# Patient Record
Sex: FEMALE | Race: WHITE | Employment: PART TIME | URBAN - METROPOLITAN AREA
[De-identification: names, ages, dates, MRNs, and addresses within clinical notes are randomized per-mention and may not be internally consistent; named-entity substitution may affect disease eponyms.]

---
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
## 2023-01-11 ENCOUNTER — HOSPITAL ENCOUNTER (EMERGENCY)
Age: 18
Discharge: HOME | End: 2023-01-11
Payer: COMMERCIAL

## 2023-01-11 VITALS
HEART RATE: 78 BPM | HEIGHT: 63 IN | DIASTOLIC BLOOD PRESSURE: 66 MMHG | WEIGHT: 160 LBS | BODY MASS INDEX: 28.35 KG/M2 | OXYGEN SATURATION: 99 % | RESPIRATION RATE: 18 BRPM | SYSTOLIC BLOOD PRESSURE: 117 MMHG | TEMPERATURE: 97.7 F

## 2023-01-11 DIAGNOSIS — R30.0 DYSURIA: Primary | ICD-10-CM

## 2023-01-11 DIAGNOSIS — R31.9 HEMATURIA, UNSPECIFIED TYPE: ICD-10-CM

## 2023-01-11 LAB
BACTERIA: ABNORMAL /HPF
BILIRUBIN URINE: ABNORMAL
BLOOD, URINE: ABNORMAL
CLARITY: ABNORMAL
COLOR: YELLOW
EPITHELIAL CELLS, UA: ABNORMAL /HPF (ref 0–5)
GLUCOSE URINE: NEGATIVE MG/DL
HCG(URINE) PREGNANCY TEST: NEGATIVE
KETONES, URINE: 15 MG/DL
LEUKOCYTE ESTERASE, URINE: ABNORMAL
MICROSCOPIC EXAMINATION: YES
NITRITE, URINE: NEGATIVE
PH UA: 5.5 (ref 5–8)
PROTEIN UA: 30 MG/DL
RBC UA: ABNORMAL /HPF (ref 0–4)
SPECIFIC GRAVITY UA: >=1.03 (ref 1–1.03)
URINE REFLEX TO CULTURE: ABNORMAL
URINE TYPE: ABNORMAL
UROBILINOGEN, URINE: 1 E.U./DL
WBC UA: ABNORMAL /HPF (ref 0–5)

## 2023-01-11 PROCEDURE — 81001 URINALYSIS AUTO W/SCOPE: CPT

## 2023-01-11 PROCEDURE — 87086 URINE CULTURE/COLONY COUNT: CPT

## 2023-01-11 PROCEDURE — 84703 CHORIONIC GONADOTROPIN ASSAY: CPT

## 2023-01-11 PROCEDURE — 99283 EMERGENCY DEPT VISIT LOW MDM: CPT

## 2023-01-11 RX ORDER — METFORMIN HYDROCHLORIDE 500 MG/1
1000 TABLET, EXTENDED RELEASE ORAL
COMMUNITY
Start: 2022-09-29 | End: 2023-03-28

## 2023-01-11 RX ORDER — TRAZODONE HYDROCHLORIDE 50 MG/1
TABLET ORAL
COMMUNITY
Start: 2022-12-29

## 2023-01-11 RX ORDER — PHENAZOPYRIDINE HYDROCHLORIDE 100 MG/1
200 TABLET, FILM COATED ORAL 3 TIMES DAILY PRN
Qty: 6 TABLET | Refills: 0 | Status: SHIPPED | OUTPATIENT
Start: 2023-01-11 | End: 2023-01-13

## 2023-01-11 ASSESSMENT — LIFESTYLE VARIABLES
HOW MANY STANDARD DRINKS CONTAINING ALCOHOL DO YOU HAVE ON A TYPICAL DAY: PATIENT DOES NOT DRINK
HOW OFTEN DO YOU HAVE A DRINK CONTAINING ALCOHOL: NEVER

## 2023-01-11 ASSESSMENT — ENCOUNTER SYMPTOMS
BACK PAIN: 0
ABDOMINAL PAIN: 0
SHORTNESS OF BREATH: 0
DIARRHEA: 0
VOMITING: 0
NAUSEA: 0
COUGH: 0
EYE PAIN: 0

## 2023-01-11 ASSESSMENT — PAIN DESCRIPTION - DESCRIPTORS: DESCRIPTORS: SHARP;PRESSURE

## 2023-01-11 ASSESSMENT — PAIN - FUNCTIONAL ASSESSMENT
PAIN_FUNCTIONAL_ASSESSMENT: 0-10
PAIN_FUNCTIONAL_ASSESSMENT: ACTIVITIES ARE NOT PREVENTED

## 2023-01-11 ASSESSMENT — PAIN DESCRIPTION - PAIN TYPE: TYPE: ACUTE PAIN

## 2023-01-11 NOTE — ED NOTES
Pt scripts x1 instructed to follow up with [facility]. Assessed per Jefe Goodman LPN  49/11/56 7312

## 2023-01-11 NOTE — ED PROVIDER NOTES
[address] [facility]        Pt Name: Alli Pascual  MRN: [id number]  Birthdate 2005  Date of evaluation: 1/11/2023  Provider: KIA Bailey  PCP: Mee Bach MD  Note Started: 12:43 PM EST 1/11/23      TELMA. I have evaluated this patient. My supervising physician was available for consultation. CHIEF COMPLAINT       Chief Complaint   Patient presents with    Hematuria     Pt c/o hematuria  \"for the past year. \" Pt states she has seen urology and just keeps being told its recurrent UTIs. C/o pelvic pressure and burning with urination. HISTORY OF PRESENT ILLNESS: 1 or more Elements     History From: self      Irene Pascual is a 21 y.o. female who presents presents to the emergency department for evaluation of hematuria and dysuria. PMH of insulin resistance and pre-DM, f/b endocrinology on Metformin. Onset over the last 2 weeks. Associated with waxing and waning diffuse lower pelvic pressure worse with urinating. Patient states that she has been dealing with this intermittently for the past year. She had urethral reflux as a child. She reports she has seen her gynecologist and urologist and had an ultrasound and a cystoscopy done last year which were all normal, per the patient. She states that she has not received a diagnosis and she keeps on getting antibiotics prescribed however the urine culture always comes back negative. She has been monogamous with her boyfriend for several years. She denies vaginal discharge, fever, chills. Denies flank pain. No recent travel, exposure to sick contacts No other acute concerns, associated symptoms or modifying factors. The patient denies fever or chills, chest pain, shortness of breath, abdominal pain, nausea, vomiting, diarrhea. No recent travel, exposure to sick contacts, or recent antibiotics. No other acute concerns, associated symptoms or modifying factors.       Nursing Notes were all reviewed and agreed with or any disagreements were addressed in the HPI. REVIEW OF SYSTEMS :      Review of Systems   Constitutional:  Negative for chills, fatigue and fever. Eyes:  Negative for pain. Respiratory:  Negative for cough and shortness of breath. Cardiovascular:  Negative for chest pain. Gastrointestinal:  Negative for abdominal pain, diarrhea, nausea and vomiting. Genitourinary:  Positive for dysuria and hematuria. Negative for decreased urine volume, difficulty urinating, dyspareunia, enuresis, flank pain, frequency, genital sores, menstrual problem, urgency, vaginal bleeding, vaginal discharge and vaginal pain. Musculoskeletal:  Negative for back pain, neck pain and neck stiffness. Skin:  Negative for rash. Neurological:  Negative for dizziness and headaches. Psychiatric/Behavioral:  Negative for confusion. Positives and Pertinent negatives as per HPI. SURGICAL HISTORY     Past Surgical History:   Procedure Laterality Date    HIATAL HERNIA REPAIR      TONSILLECTOMY      TYMPANOSTOMY TUBE PLACEMENT         CURRENTMEDICATIONS       Previous Medications    ELINEST 0.3-30 MG-MCG PER TABLET        METFORMIN (GLUCOPHAGE-XR) 500 MG EXTENDED RELEASE TABLET    Take 1,000 mg by mouth daily (with breakfast)    TRAZODONE (DESYREL) 50 MG TABLET           ALLERGIES     Patient has no known allergies. FAMILYHISTORY     History reviewed. No pertinent family history.      SOCIAL HISTORY       Social History     Tobacco Use    Smoking status: Never    Smokeless tobacco: Never   Substance Use Topics    Alcohol use: No    Drug use: No       SCREENINGS        [place] Coma Scale  Eye Opening: Spontaneous  Best Verbal Response: Oriented  Best Motor Response: Obeys commands  Roxanne Coma Scale Score: 15                CIWA Assessment  BP: 117/66  Heart Rate: 78           PHYSICAL EXAM  1 or more Elements     ED Triage Vitals [01/11/23 1151]   BP Temp Temp Source Heart Rate Resp SpO2 Height Weight - Scale   117/66 97.7 °F (36.5 °C) Oral 78 18 99 % 5' 3\" (1.6 m) 160 lb (72.6 kg)       Physical Exam  Vitals and nursing note reviewed. Constitutional:       General: She is not in acute distress. Appearance: She is well-developed. She is not diaphoretic. HENT:      Head: Normocephalic and atraumatic. Eyes:      General:         Right eye: No discharge. Left eye: No discharge. Pulmonary:      Effort: Pulmonary effort is normal. No respiratory distress. Breath sounds: No stridor. Abdominal:      General: Abdomen is flat. Palpations: Abdomen is soft. Tenderness: There is no abdominal tenderness. There is no guarding or rebound. Musculoskeletal:         General: Normal range of motion. Cervical back: Normal range of motion and neck supple. Skin:     General: Skin is warm and dry. Coloration: Skin is not pale. Neurological:      Mental Status: She is alert and oriented to person, place, and time. Comments: No gross facial drooping. Moves all 4 extremities spontaneously. Psychiatric:         Mood and Affect: Mood normal.         Behavior: Behavior normal.           DIAGNOSTIC RESULTS   LABS:    Labs Reviewed   URINALYSIS WITH REFLEX TO CULTURE - Abnormal; Notable for the following components:       Result Value    Clarity, UA CLOUDY (*)     Bilirubin Urine SMALL (*)     Ketones, Urine 15 (*)     Blood, Urine LARGE (*)     Protein, UA 30 (*)     Leukocyte Esterase, Urine TRACE (*)     All other components within normal limits   MICROSCOPIC URINALYSIS - Abnormal; Notable for the following components:    RBC, UA 21-50 (*)     Bacteria, UA Rare (*)     All other components within normal limits   CULTURE, URINE   PREGNANCY, URINE       When ordered only abnormal lab results are displayed. All other labs were within normal range or not returned as of this dictation. EKG:  When ordered, EKG's are interpreted by the Emergency Department Physician in the absence of a cardiologist.  Please see their note for interpretation of EKG. RADIOLOGY:   Non-plain film images such as CT, Ultrasound and MRI are read by the radiologist. Plain radiographic images are visualized and preliminarily interpreted by the ED Provider with the below findings:        Interpretation per the Radiologist below, if available at the time of this note:    No orders to display     No results found. No results found. PROCEDURES   Unless otherwise noted below, none     Procedures    CRITICAL CARE TIME (.cctime)       PAST MEDICAL HISTORY      has a past medical history of Diabetes mellitus ([facility] [address] EMERGENCY DEPARTMENT COURSE and DIFFERENTIAL DIAGNOSIS/MDM:   Vitals:    Vitals:    01/11/23 1151   BP: 117/66   Pulse: 78   Resp: 18   Temp: 97.7 °F (36.5 °C)   TempSrc: Oral   SpO2: 99%   Weight: 160 lb (72.6 kg)   Height: 5' 3\" (1.6 m)       Patient was given the following medications:  Medications - No data to display          Is this patient to be included in the SEP-1 Core Measure due to severe sepsis or septic shock? No   Exclusion criteria - the patient is NOT to be included for SEP-1 Core Measure due to: Infection is not suspected    Chronic Conditions affecting care:    has a past medical history of Diabetes mellitus ([facility] [address] CONSULTS: (Who and What was discussed)  None      Social Determinants : None    Records Reviewed (Source): PMD endocrinology reviewed - insulin resistance, pre-DM. On metformin. Not on insulin. CC/HPI Summary, DDx, ED Course, and Reassessment:   Differential Diagnosis: Urinary retention, pyelonephritis, bladder infection, urosepsis, GI emergency, surgical emergency, dehydration, other    Patient is afebrile and nontoxic with unremarkable vital signs. Presented for c/o hematuria and dysuria onset 2 weeks. This has been an intermittent issue for the past year and reports she's had a normal cystoscopy and pelvic ultrasound with Urology group.  Urine Cx grows only normal cassius. VS normal. Abdomen soft, nonsurgical abdomen, reassuring exam. No CVA tenderness to suggest pyelonephritis. Do not suspect sepsis. Do not suspect acute GI emergency. Obtained UA. Considered further evaluation with CBC/CMP/lipase/CT however given reassuring VS, physical exam, I have very low suspicion for Pyelo, Sepsis, GI emergency. Urinalysis negative for WBCs or nitrites which does not strongly suggest UTI. She does have RBCs. Patient hesitant to go on another antibiotic when cultures are always negative which I do agree with, will have urine cultured today. Will prescribe Pyridium for dysuria pain which she states has been helpful in the past.   Had lengthy discussion regarding soaps/detergents/condoms/etc as possible irritants and to avoid as much as possible. Encouraged close follow up with urology group. Given information for [facility] urogyn for second opinion as desired. Discussed possible interstitial cystitis which urology could further evaluate her for. At this time I believe patient's presentation does not warrant further workup with labs or imaging in the emergency department and is stable for discharge home. I estimate there is LOW risk for ACUTE APPENDICITIS, BOWEL OBSTRUCTION, CHOLECYSTITIS, DIVERTICULITIS, INCARCERATED HERNIA, PANCREATITIS, PELVIC INFLAMMATORY DISEASE, OVARIAN TORSION, PERFORATED BOWEL,  BOWEL ISCHEMIA, CARDIAC ISCHEMIA, ECTOPIC PREGNANCY, or TUBO-OVARIAN ABSCESS, thus I consider the discharge disposition reasonable. Also, there is no evidence or peritonitis, sepsis, or toxicity. At this time I believe patient's presentation does not warrant further workup with labs or imaging in the emergency department and is stable for discharge home.  Patient will be discharged with medications as listed below with instructions to follow up with the primary care physician for reevaluation in the next few days, and to return to the emergency department for any worsening symptoms or further concerns. They verbalized understanding and were discharged in stable condition. I am the Primary Clinician of Record. FINAL IMPRESSION      1. Dysuria    2.  Hematuria, unspecified type          DISPOSITION/PLAN     DISPOSITION Decision To Discharge 01/11/2023 12:50:51 PM      PATIENT REFERRED TO:    call [address] [place] [address] [facility] for 2nd opinion if desired: Call [phone], [address] [facility], [address] [place] [address], [address] [place] [address]          follow up with [facility] for further evaluation        DISCHARGE MEDICATIONS:  New Prescriptions    PHENAZOPYRIDINE (PYRIDIUM) 100 MG TABLET    Take 2 tablets by mouth 3 times daily as needed for Pain       DISCONTINUED MEDICATIONS:  Discontinued Medications    No medications on file              (Please note that portions of this note were completed with a voice recognition program.  Efforts were made to edit the dictations but occasionally words are mis-transcribed.)    KIA Batista (electronically signed)       KIA Batista  01/11/23 [place], [place]  01/12/23 5225

## 2023-01-12 LAB — URINE CULTURE, ROUTINE: NORMAL

## 2023-02-23 ENCOUNTER — HOSPITAL ENCOUNTER (EMERGENCY)
Age: 18
Discharge: HOME | End: 2023-02-23
Attending: EMERGENCY MEDICINE
Payer: COMMERCIAL

## 2023-02-23 VITALS
HEART RATE: 51 BPM | OXYGEN SATURATION: 98 % | TEMPERATURE: 98.3 F | HEIGHT: 64 IN | RESPIRATION RATE: 14 BRPM | SYSTOLIC BLOOD PRESSURE: 106 MMHG | BODY MASS INDEX: 27.31 KG/M2 | DIASTOLIC BLOOD PRESSURE: 63 MMHG | WEIGHT: 160 LBS

## 2023-02-23 DIAGNOSIS — R51.9 NONINTRACTABLE HEADACHE, UNSPECIFIED CHRONICITY PATTERN, UNSPECIFIED HEADACHE TYPE: Primary | ICD-10-CM

## 2023-02-23 LAB
A/G RATIO: 1.5 (ref 1.1–2.2)
ALBUMIN SERPL-MCNC: 4.3 G/DL (ref 3.4–5)
ALP BLD-CCNC: 63 U/L (ref 40–129)
ALT SERPL-CCNC: 20 U/L (ref 10–40)
ANION GAP SERPL CALCULATED.3IONS-SCNC: 9 MMOL/L (ref 3–16)
AST SERPL-CCNC: 26 U/L (ref 15–37)
BACTERIA: ABNORMAL /HPF
BASOPHILS ABSOLUTE: 0 K/UL (ref 0–0.2)
BASOPHILS RELATIVE PERCENT: 0.8 %
BILIRUB SERPL-MCNC: 0.5 MG/DL (ref 0–1)
BILIRUBIN URINE: NEGATIVE
BLOOD, URINE: ABNORMAL
BUN BLDV-MCNC: 7 MG/DL (ref 7–20)
CALCIUM SERPL-MCNC: 9.8 MG/DL (ref 8.3–10.6)
CHLORIDE BLD-SCNC: 102 MMOL/L (ref 99–110)
CLARITY: ABNORMAL
CO2: 25 MMOL/L (ref 21–32)
COLOR: YELLOW
CREAT SERPL-MCNC: 0.8 MG/DL (ref 0.6–1.1)
EOSINOPHILS ABSOLUTE: 0.2 K/UL (ref 0–0.6)
EOSINOPHILS RELATIVE PERCENT: 4.4 %
EPITHELIAL CELLS, UA: ABNORMAL /HPF (ref 0–5)
GFR SERPL CREATININE-BSD FRML MDRD: >60 ML/MIN/{1.73_M2}
GLUCOSE BLD-MCNC: 84 MG/DL (ref 70–99)
GLUCOSE URINE: NEGATIVE MG/DL
HCG(URINE) PREGNANCY TEST: NEGATIVE
HCT VFR BLD CALC: 38.7 % (ref 36–48)
HEMOGLOBIN: 13 G/DL (ref 12–16)
INFLUENZA A: NOT DETECTED
INFLUENZA B: NOT DETECTED
KETONES, URINE: NEGATIVE MG/DL
LEUKOCYTE ESTERASE, URINE: ABNORMAL
LYMPHOCYTES ABSOLUTE: 2.1 K/UL (ref 1–5.1)
LYMPHOCYTES RELATIVE PERCENT: 48.1 %
MCH RBC QN AUTO: 26.8 PG (ref 26–34)
MCHC RBC AUTO-ENTMCNC: 33.5 G/DL (ref 31–36)
MCV RBC AUTO: 80.1 FL (ref 80–100)
MICROSCOPIC EXAMINATION: YES
MONO TEST: NEGATIVE
MONOCYTES ABSOLUTE: 0.3 K/UL (ref 0–1.3)
MONOCYTES RELATIVE PERCENT: 7.4 %
NEUTROPHILS ABSOLUTE: 1.7 K/UL (ref 1.7–7.7)
NEUTROPHILS RELATIVE PERCENT: 39.3 %
NITRITE, URINE: NEGATIVE
PDW BLD-RTO: 14.4 % (ref 12.4–15.4)
PH UA: 5.5 (ref 5–8)
PLATELET # BLD: 195 K/UL (ref 135–450)
PMV BLD AUTO: 8.7 FL (ref 5–10.5)
POTASSIUM SERPL-SCNC: 3.9 MMOL/L (ref 3.5–5.1)
PROTEIN UA: NEGATIVE MG/DL
RBC # BLD: 4.82 M/UL (ref 4–5.2)
RBC UA: ABNORMAL /HPF (ref 0–4)
SARS-COV-2 RNA, RT PCR: NOT DETECTED
SODIUM BLD-SCNC: 136 MMOL/L (ref 136–145)
SPECIFIC GRAVITY UA: 1.02 (ref 1–1.03)
TOTAL PROTEIN: 7.2 G/DL (ref 6.4–8.2)
URINE REFLEX TO CULTURE: YES
URINE TYPE: ABNORMAL
UROBILINOGEN, URINE: 0.2 E.U./DL
WBC # BLD: 4.4 K/UL (ref 4–11)
WBC UA: ABNORMAL /HPF (ref 0–5)

## 2023-02-23 PROCEDURE — 6360000002 HC RX W HCPCS: Performed by: EMERGENCY MEDICINE

## 2023-02-23 PROCEDURE — 80053 COMPREHEN METABOLIC PANEL: CPT

## 2023-02-23 PROCEDURE — 99284 EMERGENCY DEPT VISIT MOD MDM: CPT

## 2023-02-23 PROCEDURE — 96374 THER/PROPH/DIAG INJ IV PUSH: CPT

## 2023-02-23 PROCEDURE — 85025 COMPLETE CBC W/AUTO DIFF WBC: CPT

## 2023-02-23 PROCEDURE — 84703 CHORIONIC GONADOTROPIN ASSAY: CPT

## 2023-02-23 PROCEDURE — 87086 URINE CULTURE/COLONY COUNT: CPT

## 2023-02-23 PROCEDURE — 86308 HETEROPHILE ANTIBODY SCREEN: CPT

## 2023-02-23 PROCEDURE — 87636 SARSCOV2 & INF A&B AMP PRB: CPT

## 2023-02-23 PROCEDURE — 96375 TX/PRO/DX INJ NEW DRUG ADDON: CPT

## 2023-02-23 PROCEDURE — 81001 URINALYSIS AUTO W/SCOPE: CPT

## 2023-02-23 PROCEDURE — 2580000003 HC RX 258: Performed by: EMERGENCY MEDICINE

## 2023-02-23 RX ORDER — 0.9 % SODIUM CHLORIDE 0.9 %
1000 INTRAVENOUS SOLUTION INTRAVENOUS ONCE
Status: COMPLETED | OUTPATIENT
Start: 2023-02-23 | End: 2023-02-23

## 2023-02-23 RX ORDER — METOCLOPRAMIDE HYDROCHLORIDE 5 MG/ML
10 INJECTION INTRAMUSCULAR; INTRAVENOUS ONCE
Status: COMPLETED | OUTPATIENT
Start: 2023-02-23 | End: 2023-02-23

## 2023-02-23 RX ORDER — DIPHENHYDRAMINE HYDROCHLORIDE 50 MG/ML
25 INJECTION INTRAMUSCULAR; INTRAVENOUS ONCE
Status: COMPLETED | OUTPATIENT
Start: 2023-02-23 | End: 2023-02-23

## 2023-02-23 RX ORDER — KETOROLAC TROMETHAMINE 30 MG/ML
15 INJECTION, SOLUTION INTRAMUSCULAR; INTRAVENOUS ONCE
Status: COMPLETED | OUTPATIENT
Start: 2023-02-23 | End: 2023-02-23

## 2023-02-23 RX ADMIN — SODIUM CHLORIDE 1000 ML: 9 INJECTION, SOLUTION INTRAVENOUS at 12:30

## 2023-02-23 RX ADMIN — KETOROLAC TROMETHAMINE 15 MG: 30 INJECTION, SOLUTION INTRAMUSCULAR; INTRAVENOUS at 12:31

## 2023-02-23 RX ADMIN — METOCLOPRAMIDE 10 MG: 5 INJECTION, SOLUTION INTRAMUSCULAR; INTRAVENOUS at 12:31

## 2023-02-23 RX ADMIN — DIPHENHYDRAMINE HYDROCHLORIDE 25 MG: 50 INJECTION, SOLUTION INTRAMUSCULAR; INTRAVENOUS at 12:31

## 2023-02-23 ASSESSMENT — PAIN SCALES - GENERAL
PAINLEVEL_OUTOF10: 7
PAINLEVEL_OUTOF10: 7
PAINLEVEL_OUTOF10: 9

## 2023-02-23 ASSESSMENT — PAIN DESCRIPTION - ONSET: ONSET: ON-GOING

## 2023-02-23 ASSESSMENT — PAIN DESCRIPTION - FREQUENCY: FREQUENCY: CONTINUOUS

## 2023-02-23 ASSESSMENT — PAIN - FUNCTIONAL ASSESSMENT
PAIN_FUNCTIONAL_ASSESSMENT: 0-10
PAIN_FUNCTIONAL_ASSESSMENT: 0-10

## 2023-02-23 ASSESSMENT — PAIN DESCRIPTION - LOCATION: LOCATION: HEAD

## 2023-02-23 ASSESSMENT — PAIN DESCRIPTION - DESCRIPTORS: DESCRIPTORS: ACHING

## 2023-02-23 NOTE — LETTER
[facility]  ED  [address] [address]  Phone: [phone]  Fax: [phone]               February 23, 2023    Patient: Harshil Henry   YOB: 2005   Date of Visit: 2/23/2023       To Whom It May Concern:    Mitchell Louis was seen and treated in our emergency department on 2/23/2023. She may return to work on 02/24/2023.       Sincerely,               Signature:__________________________________

## 2023-02-23 NOTE — DISCHARGE INSTRUCTIONS
Stay hydrated take ibuprofen and Tylenol for continued symptoms. Return to emergency department any new or emergent concerns.

## 2023-02-24 LAB — URINE CULTURE, ROUTINE: NORMAL

## 2023-02-24 NOTE — ED PROVIDER NOTES
Emergency Department Provider Note  Location: [address] [place] [address]  ED  2/23/2023     Patient Identification  Irene Cooper is a 21 y.o. female    Chief Complaint  Headache (X2 days, fever 101.3 yesterday. States eyes and skin looked yellow yesterday. Lingering cough since November. )          HPI  (History provided by patient)  Patient is a 68-year-old female who says she has type \"1.5 diabetes\" who reports to the emergency department for headache for the past 2 days. She also reports that she has had subjective fevers yesterday chills and body aches. She also endorses a sore throat and dry cough. She thought her skin looked \"a bit yellow in my eyes\". She does not think it looks yellow today. Denies any sudden onset of headache progressed throughout the day no vision changes no neck pain or stiffness. Had similar headaches in the past.      I have reviewed the following nursing documentation:  Allergies: No Known Allergies    Past medical history:  has a past medical history of Diabetes mellitus ([address] Past surgical history:  has a past surgical history that includes Tympanostomy tube placement; hiatal hernia repair; and Tonsillectomy. Home medications:   Prior to Admission medications    Medication Sig Start Date End Date Taking? Authorizing Provider   metFORMIN (GLUCOPHAGE-XR) 500 MG extended release tablet Take 1,000 mg by mouth daily (with breakfast) 9/29/22 3/28/23  Historical Provider, MD   traZODone (DESYREL) 50 MG tablet  12/29/22   Historical Provider, MD Soren Andrea 0.3-30 MG-MCG per tablet  6/7/21   Historical Provider, MD       Social history:  reports that she has never smoked. She has never used smokeless tobacco. She reports that she does not drink alcohol and does not use drugs. Family history:  No family history on file.           Exam  ED Triage Vitals [02/23/23 1020]   BP Temp Temp Source Heart Rate Resp SpO2 Height Weight - Scale   (!) 144/74 98.3 °F (36.8 °C) Oral 90 14 100 % 5' 4\" (1.626 m) 160 lb (72.6 kg)       Physical Exam  Vitals and nursing note reviewed. Constitutional:       General: She is not in acute distress. Appearance: She is well-developed. HENT:      Head: Normocephalic and atraumatic. Right Ear: Tympanic membrane and ear canal normal.      Left Ear: Tympanic membrane and ear canal normal.      Nose: Nose normal. No congestion. Mouth/Throat:      Mouth: Mucous membranes are moist.      Pharynx: Oropharynx is clear. Posterior oropharyngeal erythema present. No oropharyngeal exudate. Eyes:      General: No scleral icterus. Extraocular Movements: Extraocular movements intact. Pupils: Pupils are equal, round, and reactive to light. Neck:      Comments: No meningeal signs  Cardiovascular:      Rate and Rhythm: Normal rate and regular rhythm. Heart sounds: No murmur heard. Pulmonary:      Effort: Pulmonary effort is normal.      Breath sounds: Normal breath sounds. Abdominal:      General: There is no distension. Palpations: Abdomen is soft. Tenderness: There is no abdominal tenderness. Musculoskeletal:         General: No deformity. Normal range of motion. Cervical back: Normal range of motion and neck supple. No rigidity or tenderness. Lymphadenopathy:      Cervical: No cervical adenopathy. Skin:     General: Skin is warm. Findings: No rash. Neurological:      Mental Status: She is alert and oriented to person, place, and time. Cranial Nerves: No cranial nerve deficit. Motor: No abnormal muscle tone.       Coordination: Coordination normal.   Psychiatric:         Mood and Affect: Mood normal.         Behavior: Behavior normal.         MDM/ED Course    ED Medication Orders (From admission, onward)      Start Ordered     Status Ordering Provider    02/23/23 1215 02/23/23 1200  metoclopramide (REGLAN) injection 10 mg  ONCE         Last MAR action: Given - by Jhoan Camps on 02/23/23 at 1231 KEL KUNZ    02/23/23 1215 02/23/23 1200  diphenhydrAMINE (BENADRYL) injection 25 mg  ONCE         Last MAR action: Given - by Louann Pin on 02/23/23 at 1231 KEL KUNZ    02/23/23 1215 02/23/23 1200  ketorolac (TORADOL) injection 15 mg  ONCE         Last MAR action: Given - by Louann Pin on 02/23/23 at 1231 KEL KUNZ    02/23/23 1215 02/23/23 1200  0.9 % sodium chloride bolus  ONCE         Last MAR action: Stopped - by July Perez on 02/23/23 at 1535 [facility], [address] [facility] Rosaline BEAR              Radiology  No results found. Bedside Ultrasound  No results found. Labs  Results for orders placed or performed during the hospital encounter of 02/23/23   Culture, Urine    Specimen: Urine, clean catch   Result Value Ref Range    Urine Culture, Routine       >50,000 CFU/ml mixed skin/urogenital cassius.  No further workup   COVID-19 & Influenza Combo    Specimen: Nasopharyngeal Swab   Result Value Ref Range    SARS-CoV-2 RNA, RT PCR NOT DETECTED NOT DETECTED    INFLUENZA A NOT DETECTED NOT DETECTED    INFLUENZA B NOT DETECTED NOT DETECTED   CBC with Auto Differential   Result Value Ref Range    WBC 4.4 4.0 - 11.0 K/uL    RBC 4.82 4.00 - 5.20 M/uL    Hemoglobin 13.0 12.0 - 16.0 g/dL    Hematocrit 38.7 36.0 - 48.0 %    MCV 80.1 80.0 - 100.0 fL    MCH 26.8 26.0 - 34.0 pg    MCHC 33.5 31.0 - 36.0 g/dL    RDW 14.4 12.4 - 15.4 %    Platelets 739 126 - 829 K/uL    MPV 8.7 5.0 - 10.5 fL    Neutrophils % 39.3 %    Lymphocytes % 48.1 %    Monocytes % 7.4 %    Eosinophils % 4.4 %    Basophils % 0.8 %    Neutrophils Absolute 1.7 1.7 - 7.7 K/uL    Lymphocytes Absolute 2.1 1.0 - 5.1 K/uL    Monocytes Absolute 0.3 0.0 - 1.3 K/uL    Eosinophils Absolute 0.2 0.0 - 0.6 K/uL    Basophils Absolute 0.0 0.0 - 0.2 K/uL   Comprehensive Metabolic Panel   Result Value Ref Range    Sodium 136 136 - 145 mmol/L    Potassium 3.9 3.5 - 5.1 mmol/L    Chloride 102 99 - 110 mmol/L    CO2 25 21 - 32 mmol/L    Anion Gap 9 3 - 16 Glucose 84 70 - 99 mg/dL    BUN 7 7 - 20 mg/dL    Creatinine 0.8 0.6 - 1.1 mg/dL    Est, Glom Filt Rate >60 >60    Calcium 9.8 8.3 - 10.6 mg/dL    Total Protein 7.2 6.4 - 8.2 g/dL    Albumin 4.3 3.4 - 5.0 g/dL    Albumin/Globulin Ratio 1.5 1.1 - 2.2    Total Bilirubin 0.5 0.0 - 1.0 mg/dL    Alkaline Phosphatase 63 40 - 129 U/L    ALT 20 10 - 40 U/L    AST 26 15 - 37 U/L   Urinalysis with Reflex to Culture    Specimen: Urine   Result Value Ref Range    Color, UA Yellow Straw/Yellow    Clarity, UA CLOUDY (A) Clear    Glucose, Ur Negative Negative mg/dL    Bilirubin Urine Negative Negative    Ketones, Urine Negative Negative mg/dL    Specific Gravity, UA 1.020 1.005 - 1.030    Blood, Urine TRACE-LYSED (A) Negative    pH, UA 5.5 5.0 - 8.0    Protein, UA Negative Negative mg/dL    Urobilinogen, Urine 0.2 <2.0 E.U./dL    Nitrite, Urine Negative Negative    Leukocyte Esterase, Urine MODERATE (A) Negative    Microscopic Examination YES     Urine Type NotGiven     Urine Reflex to Culture Yes    Pregnancy, Urine   Result Value Ref Range    HCG(Urine) Pregnancy Test Negative Detects HCG level >20 MIU/mL   Microscopic Urinalysis   Result Value Ref Range    WBC, UA 10-20 (A) 0 - 5 /HPF    RBC, UA 3-4 0 - 4 /HPF    Epithelial Cells, UA 11-20 (A) 0 - 5 /HPF    Bacteria, UA 4+ (A) None Seen /HPF   Mononucleosis Screen   Result Value Ref Range    Mono Test Negative Negative         Procedures  Procedures      Patient seen and evaluated. Relevant records reviewed. - Patient is 21 y.o. female presented for acute headache fevers chills cough sore throat in setting or chronic conditions non-insulin-dependent diabetes. Her evaluation here is consistent with viral URI and associated headache. No concern for meningitis at this point nor sepsis subarachnoid nor any other emergent process at this point. .  - Exam showed well-appearing female no acute distress reassuring vital signs.   She has no objective findings on physical exam.  Mabel Taylor of the headache does not sound concerning.  - Patient was placed on telemetry during his/her ED stay and no malignant dysrhythmia observed. - Diagnostic studies reviewed. CBC CMP urinalysis no acute pathology identified  - Lab:  did not show clinically significant pathology. - Is this patient to be included in the SEP-1 Core Measure due to severe sepsis or septic shock? No   Exclusion criteria - the patient is NOT to be included for SEP-1 Core Measure due to:  Viral etiology found or highly suspected (including COVID-19) without concomitant bacterial infection    - Patient was given    ED Medication Orders (From admission, onward)      Start Ordered     Status Ordering Provider    02/23/23 1215 02/23/23 1200  metoclopramide (REGLAN) injection 10 mg  ONCE         Last MAR action: Given - by Lauren York on 02/23/23 at 1231 KEL KUNZ    02/23/23 1215 02/23/23 1200  diphenhydrAMINE (BENADRYL) injection 25 mg  ONCE         Last MAR action: Given - by Lauren York on 02/23/23 at 1231 KEL KUNZ    02/23/23 1215 02/23/23 1200  ketorolac (TORADOL) injection 15 mg  ONCE         Last MAR action: Given - by Lauren York on 02/23/23 at 1231 KEL KUNZ    02/23/23 1215 02/23/23 1200  0.9 % sodium chloride bolus  ONCE         Last MAR action: Stopped - by Marilia Martinez on 02/23/23 at 1326 KEL KUNZ            Upon reassessment, Irene Zhangain Rojas had almost complete resolution of her headache after headache cocktail and IV fluids. COVID swab flu swab are negative. .      Social Determinants of Health: Has documented PCP that she can follow-up with, reliable transportation    - I discussed the results with patient/family including incidental findings . We agreed to discharge  - At this point I do not see indication for further work-up in the ER, as it is unlikely  and poses more risk than benefit. - Return precautions also discussed.   Patient/family verbalized understanding of care plan and agreeable to plan.        Clinical Impression:  1. Nonintractable headache, unspecified chronicity pattern, unspecified headache type          Disposition:  Discharge to home in good condition.    Blood pressure 106/63, pulse 51, temperature 98.3 °F (36.8 °C), temperature source Oral, resp. rate 14, height 5' 4\" (1.626 m), weight 160 lb (72.6 kg), last menstrual period 02/02/2023, SpO2 98 %.    Patient was given scripts for the following medications. I counseled patient how to take these medications.   Discharge Medication List as of 2/23/2023  1:33 PM        Discharge Medication List as of 2/23/2023  1:33 PM          Disposition referral (if applicable):  Dayday Branch MD  [address] [place] [address] Dr. Memo Palacio  [facility] [address]  [phone]    Schedule an appointment as soon as possible for a visit         IEzequiel, am the primary attending of record and contributed the majority of evaluation and treatment of emergent care for this encounter.     Total critical care time is 0 minutes, which excludes separately billable procedures and updating family. Time spent is specifically for management of the presenting complaint and symptoms initially, direct bedside care, reevaluation, review of records, and consultation.  There was a high probability of clinically significant life-threatening deterioration in the patient's condition, which required my urgent intervention.     This chart was generated in part by using Dragon Dictation system and may contain errors related to that system including errors in grammar, punctuation, and spelling, as well as words and phrases that may be inappropriate. If there are any questions or concerns please feel free to contact the dictating provider for clarification.     Ezequiel Srivastava MD  [facility]        Ezequiel Srivastava MD  02/24/23 3759

## 2023-03-26 ENCOUNTER — HOSPITAL ENCOUNTER (EMERGENCY)
Age: 18
Discharge: HOME | End: 2023-03-26
Payer: COMMERCIAL

## 2023-03-26 ENCOUNTER — APPOINTMENT (OUTPATIENT)
Dept: GENERAL RADIOLOGY | Age: 18
End: 2023-03-26
Payer: COMMERCIAL

## 2023-03-26 VITALS
OXYGEN SATURATION: 100 % | TEMPERATURE: 97.8 F | HEART RATE: 78 BPM | DIASTOLIC BLOOD PRESSURE: 69 MMHG | RESPIRATION RATE: 18 BRPM | SYSTOLIC BLOOD PRESSURE: 125 MMHG

## 2023-03-26 DIAGNOSIS — M77.50 TENDONITIS OF FOOT: ICD-10-CM

## 2023-03-26 DIAGNOSIS — M79.672 LEFT FOOT PAIN: Primary | ICD-10-CM

## 2023-03-26 PROCEDURE — 99283 EMERGENCY DEPT VISIT LOW MDM: CPT

## 2023-03-26 PROCEDURE — 6370000000 HC RX 637 (ALT 250 FOR IP): Performed by: NURSE PRACTITIONER

## 2023-03-26 PROCEDURE — 73630 X-RAY EXAM OF FOOT: CPT

## 2023-03-26 RX ORDER — ACETAMINOPHEN 500 MG
1000 TABLET ORAL ONCE
Status: COMPLETED | OUTPATIENT
Start: 2023-03-26 | End: 2023-03-26

## 2023-03-26 RX ORDER — NAPROXEN 250 MG/1
500 TABLET ORAL ONCE
Status: COMPLETED | OUTPATIENT
Start: 2023-03-26 | End: 2023-03-26

## 2023-03-26 RX ORDER — NAPROXEN 500 MG/1
500 TABLET ORAL 2 TIMES DAILY WITH MEALS
Qty: 60 TABLET | Refills: 0 | Status: SHIPPED | OUTPATIENT
Start: 2023-03-26

## 2023-03-26 RX ADMIN — NAPROXEN 500 MG: 250 TABLET ORAL at 14:50

## 2023-03-26 RX ADMIN — ACETAMINOPHEN 1000 MG: 500 TABLET ORAL at 14:50

## 2023-03-26 ASSESSMENT — PAIN SCALES - GENERAL
PAINLEVEL_OUTOF10: 4
PAINLEVEL_OUTOF10: 4

## 2023-03-26 ASSESSMENT — PAIN DESCRIPTION - PAIN TYPE: TYPE: ACUTE PAIN

## 2023-03-26 ASSESSMENT — PAIN - FUNCTIONAL ASSESSMENT
PAIN_FUNCTIONAL_ASSESSMENT: 0-10
PAIN_FUNCTIONAL_ASSESSMENT: NONE - DENIES PAIN

## 2023-03-26 ASSESSMENT — PAIN DESCRIPTION - ORIENTATION: ORIENTATION: LEFT

## 2023-03-26 ASSESSMENT — PAIN DESCRIPTION - LOCATION: LOCATION: FOOT

## 2023-03-26 NOTE — ED PROVIDER NOTES
noted. Respirations unlabored. Good air exchange. ABDOMEN: Soft. Non-distended. Non-tender. EXTREMITIES: No peripheral edema. Moves all extremities equally. All extremities neurovascularly intact. TTP to arch of left foot, PT and DP pulses 2+, no erythema, edema. Dorsiflexion and plantar flexion intact but limited with pain. SKIN: Warm and dry. No acute rashes. NEUROLOGICAL: Alert and oriented. No gross facial drooping. Strength 5/5, sensation intact. EKG  When ordered, EKG's are interpreted by the ED Physician in the absence of a Cardiologist.  Please see their note for interpretation of EKG. LABS  Labs Reviewed - No data to display  When ordered, only abnormal lab results are displayed. All other labs were within normal range or not returned as of this dictation. RADIOLOGY  Non-plain film images such as CT, U/S, and MRI are read by the radiologist.  Plain radiographic images are visualized and preliminarily interpreted by the ED Provider with the below findings:       Interpretation per the Radiologist below, if available at the time of this note:  XR FOOT LEFT (MIN 3 VIEWS)   Final Result   Normal left foot radiographs. PROCEDURES  Unless otherwise noted below, none. ED COURSE/DDx/MDM  History obtained from:  Patient    Vitals:  Vitals:    03/26/23 1421   BP: 125/69   Pulse: 78   Resp: 18   Temp: 97.8 °F (36.6 °C)   TempSrc: Oral   SpO2: 100%     Patient received following medications in ED:  Medications   acetaminophen (TYLENOL) tablet 1,000 mg (1,000 mg Oral Given 3/26/23 1450)   naproxen (NAPROSYN) tablet 500 mg (500 mg Oral Given 3/26/23 1450)     Sepsis Consideration:  Is this patient to be included in the SEP-1 Core Measure due to severe sepsis or septic shock? No   Exclusion criteria - the patient is NOT to be included for SEP-1 Core Measure due to: Infection is not suspected    Records Reviewed:   None.     Chronic conditions affecting care:   DM.   has a past medical

## 2023-03-26 NOTE — Clinical Note
Ashley Zaragoza was seen and treated in our emergency department on 3/26/2023. She may return to work on 03/27/2023. If you have any questions or concerns, please don't hesitate to call.       Kelsey Villanueva, VIKTORIYA - CNP

## 2023-03-26 NOTE — DISCHARGE INSTRUCTIONS
You are seen today for left foot pain. Your x-ray was negative for any dislocations, fractures, swelling or fluid. You may have some tendinitis due to overuse however would recommend you follow-up with Ortho in the next 1 to 2 days. You have been given an Ace wrap and postop shoe for support as well as crutches. I would recommend elevating foot and using ice as well as rotating Tylenol with naproxen for pain control.   If you have any worsening symptoms including worsening pain, redness or swelling, changes of sensation to the foot, weakness please return to the ER for reevaluation

## 2023-07-24 LAB
C. TRACHOMATIS, EXTERNAL RESULT: NEGATIVE
N. GONORRHOEAE, EXTERNAL RESULT: NEGATIVE

## 2023-07-31 ENCOUNTER — HOSPITAL ENCOUNTER (EMERGENCY)
Age: 18
Discharge: HOME | End: 2023-08-01
Payer: COMMERCIAL

## 2023-07-31 DIAGNOSIS — O46.90 VAGINAL BLEEDING IN PREGNANCY: Primary | ICD-10-CM

## 2023-07-31 PROCEDURE — 99284 EMERGENCY DEPT VISIT MOD MDM: CPT

## 2023-07-31 ASSESSMENT — PAIN SCALES - GENERAL: PAINLEVEL_OUTOF10: 2

## 2023-07-31 ASSESSMENT — PAIN DESCRIPTION - LOCATION: LOCATION: ABDOMEN

## 2023-07-31 ASSESSMENT — PAIN - FUNCTIONAL ASSESSMENT: PAIN_FUNCTIONAL_ASSESSMENT: 0-10

## 2023-07-31 ASSESSMENT — PAIN DESCRIPTION - PAIN TYPE: TYPE: ACUTE PAIN

## 2023-07-31 ASSESSMENT — PAIN DESCRIPTION - DESCRIPTORS: DESCRIPTORS: CRAMPING

## 2023-08-01 VITALS
WEIGHT: 145 LBS | HEART RATE: 76 BPM | OXYGEN SATURATION: 98 % | SYSTOLIC BLOOD PRESSURE: 108 MMHG | HEIGHT: 63 IN | RESPIRATION RATE: 14 BRPM | DIASTOLIC BLOOD PRESSURE: 63 MMHG | BODY MASS INDEX: 25.69 KG/M2 | TEMPERATURE: 97.9 F

## 2023-08-01 LAB
ABO + RH BLD: NORMAL
ALBUMIN SERPL-MCNC: 4.6 G/DL (ref 3.4–5)
ALBUMIN/GLOB SERPL: 1.6 {RATIO} (ref 1.1–2.2)
ALP SERPL-CCNC: 57 U/L (ref 40–129)
ALT SERPL-CCNC: 11 U/L (ref 10–40)
ANION GAP SERPL CALCULATED.3IONS-SCNC: 11 MMOL/L (ref 3–16)
AST SERPL-CCNC: 20 U/L (ref 15–37)
B-HCG SERPL EIA 3RD IS-ACNC: NORMAL MIU/ML
BASOPHILS # BLD: 0 K/UL (ref 0–0.2)
BASOPHILS NFR BLD: 0.6 %
BILIRUB SERPL-MCNC: <0.2 MG/DL (ref 0–1)
BILIRUB UR QL STRIP.AUTO: NEGATIVE
BUN SERPL-MCNC: 6 MG/DL (ref 7–20)
CALCIUM SERPL-MCNC: 9.8 MG/DL (ref 8.3–10.6)
CHLORIDE SERPL-SCNC: 104 MMOL/L (ref 99–110)
CLARITY UR: CLEAR
CO2 SERPL-SCNC: 22 MMOL/L (ref 21–32)
COLOR UR: YELLOW
CREAT SERPL-MCNC: 0.7 MG/DL (ref 0.6–1.1)
DEPRECATED RDW RBC AUTO: 13.8 % (ref 12.4–15.4)
EOSINOPHIL # BLD: 0.2 K/UL (ref 0–0.6)
EOSINOPHIL NFR BLD: 2.9 %
EPI CELLS #/AREA URNS HPF: NORMAL /HPF (ref 0–5)
GFR SERPLBLD CREATININE-BSD FMLA CKD-EPI: >60 ML/MIN/{1.73_M2}
GLUCOSE SERPL-MCNC: 85 MG/DL (ref 70–99)
GLUCOSE UR STRIP.AUTO-MCNC: NEGATIVE MG/DL
HCT VFR BLD AUTO: 33.6 % (ref 36–48)
HGB BLD-MCNC: 11.5 G/DL (ref 12–16)
HGB UR QL STRIP.AUTO: ABNORMAL
INR PPP: 1.03 (ref 0.84–1.16)
KETONES UR STRIP.AUTO-MCNC: NEGATIVE MG/DL
LEUKOCYTE ESTERASE UR QL STRIP.AUTO: ABNORMAL
LIPASE SERPL-CCNC: 48 U/L (ref 13–60)
LYMPHOCYTES # BLD: 2.6 K/UL (ref 1–5.1)
LYMPHOCYTES NFR BLD: 42.4 %
MCH RBC QN AUTO: 27.5 PG (ref 26–34)
MCHC RBC AUTO-ENTMCNC: 34.2 G/DL (ref 31–36)
MCV RBC AUTO: 80.3 FL (ref 80–100)
MONOCYTES # BLD: 0.5 K/UL (ref 0–1.3)
MONOCYTES NFR BLD: 7.8 %
NEUTROPHILS # BLD: 2.8 K/UL (ref 1.7–7.7)
NEUTROPHILS NFR BLD: 46.3 %
NITRITE UR QL STRIP.AUTO: NEGATIVE
PH UR STRIP.AUTO: 7 [PH] (ref 5–8)
PLATELET # BLD AUTO: 212 K/UL (ref 135–450)
PMV BLD AUTO: 8.2 FL (ref 5–10.5)
POTASSIUM SERPL-SCNC: 3.8 MMOL/L (ref 3.5–5.1)
PROT SERPL-MCNC: 7.4 G/DL (ref 6.4–8.2)
PROT UR STRIP.AUTO-MCNC: NEGATIVE MG/DL
PROTHROMBIN TIME: 13.5 SEC (ref 11.5–14.8)
RBC # BLD AUTO: 4.19 M/UL (ref 4–5.2)
RBC #/AREA URNS HPF: NORMAL /HPF (ref 0–4)
SODIUM SERPL-SCNC: 137 MMOL/L (ref 136–145)
SP GR UR STRIP.AUTO: 1.01 (ref 1–1.03)
UA DIPSTICK W REFLEX MICRO PNL UR: YES
URN SPEC COLLECT METH UR: ABNORMAL
UROBILINOGEN UR STRIP-ACNC: 1 E.U./DL
WBC # BLD AUTO: 6.1 K/UL (ref 4–11)
WBC #/AREA URNS HPF: NORMAL /HPF (ref 0–5)

## 2023-08-01 PROCEDURE — 80053 COMPREHEN METABOLIC PANEL: CPT

## 2023-08-01 PROCEDURE — 2580000003 HC RX 258: Performed by: PHYSICIAN ASSISTANT

## 2023-08-01 PROCEDURE — 84702 CHORIONIC GONADOTROPIN TEST: CPT

## 2023-08-01 PROCEDURE — 86901 BLOOD TYPING SEROLOGIC RH(D): CPT

## 2023-08-01 PROCEDURE — 81001 URINALYSIS AUTO W/SCOPE: CPT

## 2023-08-01 PROCEDURE — 83690 ASSAY OF LIPASE: CPT

## 2023-08-01 PROCEDURE — 86900 BLOOD TYPING SEROLOGIC ABO: CPT

## 2023-08-01 PROCEDURE — 85025 COMPLETE CBC W/AUTO DIFF WBC: CPT

## 2023-08-01 PROCEDURE — 85610 PROTHROMBIN TIME: CPT

## 2023-08-01 RX ORDER — 0.9 % SODIUM CHLORIDE 0.9 %
500 INTRAVENOUS SOLUTION INTRAVENOUS ONCE
Status: COMPLETED | OUTPATIENT
Start: 2023-08-01 | End: 2023-08-01

## 2023-08-01 RX ADMIN — SODIUM CHLORIDE 500 ML: 9 INJECTION, SOLUTION INTRAVENOUS at 02:25

## 2023-08-02 NOTE — ED PROVIDER NOTES
DIVERTICULITIS, INCARCERATED HERNIA, PANCREATITIS, PELVIC INFLAMMATORY DISEASE, PERFORATED BOWEL or ULCER, or TUBO-OVARIAN ABSCESS, thus I consider the discharge disposition reasonable. Also, there is no evidence or peritonitis, sepsis, or toxicity. Irene Henry and I have also discussed returning to the Emergency Department immediately if new or worsening symptoms occur. We have discussed the symptoms which are most concerning (e.g., bloody stool, fever, changing or worsening pain, vomiting, vaginal bleeding with use super pad/tampon per hour for greater than 3 hours) that necessitate immediate return. FINAL IMPRESSION  1. Vaginal bleeding in pregnancy      Patient referred to:  Wesley Tobias MD  [address], [address]  [phone]    Schedule an appointment as soon as possible for a visit       [facility]  ED  [address]  [address] [facility]  [phone]    If symptoms worsen    Discharge Medications:   Discharge Medication List as of 8/1/2023  2:56 AM        Discontinued Medications:  Discharge Medication List as of 8/1/2023  2:56 AM        Risk management discussed and shared decision making had with patient and/or surrogate. All questions were answered. Patient will follow up with OB within 2 to 3 days for further evaluation/treatment. All questions answered. Patient will return to ED for new/worsening symptoms. DISPOSITION:  Patient was discharged home in stable condition. (Please note that portions of this note were completed with a voice recognition program.  Efforts were made to edit the dictations but occasionally words are mis-transcribed).          Lesvia Mayes  08/01/23 2008

## 2023-08-16 LAB
ABO, EXTERNAL RESULT: NORMAL
HEP B, EXTERNAL RESULT: NEGATIVE
HEPATITIS C ANTIBODY, EXTERNAL RESULT: NEGATIVE
HIV, EXTERNAL RESULT: NEGATIVE
RH FACTOR, EXTERNAL RESULT: POSITIVE
RPR, EXTERNAL RESULT: NORMAL
RUBELLA TITER, EXTERNAL RESULT: NORMAL

## 2023-12-06 ENCOUNTER — HOSPITAL ENCOUNTER (OUTPATIENT)
Age: 18
Discharge: HOME | End: 2023-12-06
Attending: OBSTETRICS & GYNECOLOGY | Admitting: OBSTETRICS & GYNECOLOGY
Payer: MEDICAID

## 2023-12-06 VITALS
HEART RATE: 60 BPM | TEMPERATURE: 98.2 F | DIASTOLIC BLOOD PRESSURE: 50 MMHG | SYSTOLIC BLOOD PRESSURE: 95 MMHG | OXYGEN SATURATION: 100 % | RESPIRATION RATE: 16 BRPM

## 2023-12-06 PROCEDURE — 99203 OFFICE O/P NEW LOW 30 MIN: CPT

## 2023-12-06 PROCEDURE — 6370000000 HC RX 637 (ALT 250 FOR IP): Performed by: OBSTETRICS & GYNECOLOGY

## 2023-12-06 RX ORDER — ACETAMINOPHEN 325 MG/1
650 TABLET ORAL EVERY 4 HOURS PRN
Status: DISCONTINUED | OUTPATIENT
Start: 2023-12-06 | End: 2023-12-06 | Stop reason: HOSPADM

## 2023-12-06 RX ORDER — OMEPRAZOLE 20 MG/1
20 CAPSULE, DELAYED RELEASE ORAL DAILY
COMMUNITY

## 2023-12-06 RX ADMIN — ACETAMINOPHEN 650 MG: 325 TABLET ORAL at 10:29

## 2023-12-06 ASSESSMENT — PAIN DESCRIPTION - LOCATION: LOCATION: ABDOMEN

## 2023-12-06 ASSESSMENT — PAIN SCALES - GENERAL: PAINLEVEL_OUTOF10: 5

## 2023-12-06 ASSESSMENT — PAIN DESCRIPTION - DESCRIPTORS: DESCRIPTORS: CRAMPING

## 2023-12-06 NOTE — DISCHARGE INSTRUCTIONS
Rest and stay well hydrated  Call to make appointment for 12/7 or 12/8 for follow up    Diet/Activity/Restrictions:  Regular  Limit caffeine consumption  Increase your fluid intake  Eat small meals-but often. Rise from laying/sitting position to standing slowly. Avoid laying on your back, sidelying positions are best, left side is preferred. Weigh yourself daily and write down what you weigh. If you had a vaginal exam you may have some bloody mucous or brown vaginal discharge. If your doctor/midwife has ordered antibiotics, get it filled right away and take all of the medication as it has been prescribed. When to call your doctor: If you have severe back pain. Period-like cramps that may come and go. May feel like baby is balling up. Low, dull backache, not relieved by rest.  Pressure in your vagina or lower abdomen that may feel like the baby is pushing down. Change in the type or amount of vaginal discharge. Abdominal cramps that may be accompanied by diarrhea. 4-5 uterine contractions in one hour. Fluid leaking from your vagina (may or may not be bloody)  If you have vaginal bleeding. If you have a temperature of 100.6 or more. If your baby has a decrease in activity. Less than 5 times in an hour. If you feel you are not getting better or you are concerned. If you develop a headache, not resolved with your usual interventions. If you have changes in your vision (blurring or spots in your vision). If you have burning with urination, urgency or frequency. If you have pain in your abdomen, up by your ribs.     General Instructions:    Nausea and Vomiting  Keep dry toast/crackers with you to munch on  Eat small frequent meals  Try to keep something in your stomach (don't let your stomach get empty)  Take your time getting up  Avoid smells that make you feel sick    Travel  Wear seatbelts or safety/lap belts  Walk frequently, every 1-2 hours  Wear clothing that does not constrict and

## 2023-12-06 NOTE — PROGRESS NOTES
Care assumed. EFM adjusted. Pt resting comfortably, denies needs at this time. Visitor remains @ BS.

## 2023-12-06 NOTE — PROGRESS NOTES
Dr. Harmony Terry in house attending to another patient at this time. Will come evaluate when finished.

## 2024-01-13 ENCOUNTER — HOSPITAL ENCOUNTER (EMERGENCY)
Age: 19
Discharge: HOME | End: 2024-01-13
Attending: EMERGENCY MEDICINE
Payer: MEDICAID

## 2024-01-13 VITALS
BODY MASS INDEX: 30.22 KG/M2 | HEIGHT: 64 IN | DIASTOLIC BLOOD PRESSURE: 67 MMHG | HEART RATE: 65 BPM | WEIGHT: 177 LBS | OXYGEN SATURATION: 100 % | TEMPERATURE: 98.2 F | SYSTOLIC BLOOD PRESSURE: 110 MMHG | RESPIRATION RATE: 16 BRPM

## 2024-01-13 DIAGNOSIS — L30.9 DERMATITIS: Primary | ICD-10-CM

## 2024-01-13 PROCEDURE — 99283 EMERGENCY DEPT VISIT LOW MDM: CPT

## 2024-01-13 RX ORDER — PREDNISONE 10 MG/1
TABLET ORAL
Qty: 20 TABLET | Refills: 0 | Status: SHIPPED | OUTPATIENT
Start: 2024-01-13 | End: 2024-01-20

## 2024-01-13 RX ORDER — PERMETHRIN 50 MG/G
CREAM TOPICAL
COMMUNITY
Start: 2024-01-09

## 2024-01-13 RX ORDER — TRIAMCINOLONE ACETONIDE 0.25 MG/G
OINTMENT TOPICAL
Qty: 80 G | Refills: 1 | Status: SHIPPED | OUTPATIENT
Start: 2024-01-13 | End: 2024-01-20

## 2024-01-13 RX ORDER — ACYCLOVIR 800 MG/1
TABLET ORAL
COMMUNITY
Start: 2024-01-09

## 2024-01-13 ASSESSMENT — ENCOUNTER SYMPTOMS
DIARRHEA: 0
VOMITING: 0
RHINORRHEA: 0
COUGH: 0
SHORTNESS OF BREATH: 0
BACK PAIN: 0
ABDOMINAL PAIN: 0
CHEST TIGHTNESS: 0

## 2024-01-13 ASSESSMENT — PAIN - FUNCTIONAL ASSESSMENT: PAIN_FUNCTIONAL_ASSESSMENT: NONE - DENIES PAIN

## 2024-01-13 NOTE — ED NOTES
Reviewed patient discharge instructions at this time, copy given to patient. No questions or concerns. Patient voiced understanding.

## 2024-01-13 NOTE — ED PROVIDER NOTES
Emergency Department Attending Physician Note  Location: [facility] EMERGENCY DEPARTMENT  1/13/2024       Pt Name: Irene Henry  MRN: [id number]  Birthdate 2005    Date of evaluation: 1/13/2024  Provider: MARCELA CHEUNG DO  PCP: Dayday Branch MD    Note Started: 2:03 PM EST 1/13/24    CHIEF COMPLAINT  Chief Complaint   Patient presents with    Rash     Patient reports having itchy rash/bumps to her face and torso. States she went to PCP last week and they told her she had chicken pox and put her on acyclovir. Reports the condition is worsening. Patient is pregnant.        ROOM: 5    HISTORY OF PRESENT ILLNESS:  History obtained by patient. Limitations to history : None.     Irene Henry is a 18 y.o. female with a significant PMHx of currently prescribed permethrin and azithromycin, currently 30 weeks pregnant, presenting emergency department today with a rash.  She has what appears to be patchy erythematous plaques to her left eyebrow, left face, under her bilateral breasts, on her bilateral extensor surfaces of her elbows, and some on the top of her foot.  She went to her PCP last week, per patient, and they put her on acyclovir for chickenpox, but also permethrin in case it scabies.   Not putting anything else on it.  Permethrin and acyclovir, both been using for 5 days, has not helped.  Patient has applied head to toe permethrin 5 times at night to try to get rid of this rash.  She is not effectively sure what is going on.  Otherwise, she is at multiple healthcare workers that she knows in her personal life if they she has chickenpox, and she says that no one thinks it is chickenpox.  Additionally, she says they are not painful, she has no fevers, chills, or cold symptoms.  The patches are slightly erythematous, very itchy.  She says sometimes she itches it and it gets worse.  No contact of any new products or soaps.  No recent outside hikes or any environmental exposures that she knows of.   Behavior: Behavior normal.           PROCEDURES:  Unless otherwise noted below, none.    Procedures      MEDICATIONS:   Medications - No data to display    _____________________________________    MEDICAL DECISION MAKING:     Patient is a 18 y.o. female with a significant PMHx of current 30 weeks of pregnancy, presenting with department today with concerns of a very itchy rash to her body.  Patient says this is quite consistent with her eczema, and on my physical exam, I am inclined to believe this is what appears to be nummular eczema.  Extensor surfaces, quite itchy, all over her body, and very typical presentation for her.  She has been using permethrin for the last 5 nights without improvement.  She is also on acyclovir for what her PCP thought was chickenpox?,  However patient is vaccinated, and there is absolutely no signs of pox, no rash in very stages of healing, no other respiratory symptomatology.  The rash is more of a plaque and scattered erythema.  Otherwise, attempted to take photos of the rash, but the photos were not saved in haiku x 2.  Will discharge home with prednisone taper and triamcinolone.  I very much do not leave this is scabies as it is not on the hands and feet, and it is very much not consistent with a scabies rash morphology.  Follow-up with PCP, and strict return precautions provided at length if she does not improve.      Is this patient to be included in the SEP-1 Core Measure due to severe sepsis or septic shock?   No   Exclusion criteria - the patient is NOT to be included for SEP-1 Core Measure due to:  Infection is not suspected      CLINICAL IMPRESSION:   No diagnosis found.      DISPOSITION:  I discussed the results, including any incidental findings, with patient and through shared decision making;   Disposition today from the ED will be: Discharge to home in fair condition.   Questions answered. They are agreeable to plan and express understanding of plan.     Social

## 2024-02-24 ENCOUNTER — HOSPITAL ENCOUNTER (OUTPATIENT)
Age: 19
Discharge: HOME | End: 2024-02-24
Attending: OBSTETRICS & GYNECOLOGY | Admitting: OBSTETRICS & GYNECOLOGY
Payer: MEDICAID

## 2024-02-24 VITALS — DIASTOLIC BLOOD PRESSURE: 61 MMHG | SYSTOLIC BLOOD PRESSURE: 117 MMHG | RESPIRATION RATE: 16 BRPM | HEART RATE: 81 BPM

## 2024-02-24 LAB
ABO + RH BLD: NORMAL
AMPHETAMINES UR QL SCN>1000 NG/ML: NORMAL
BARBITURATES UR QL SCN>200 NG/ML: NORMAL
BASOPHILS # BLD: 0 K/UL (ref 0–0.2)
BASOPHILS NFR BLD: 0.3 %
BENZODIAZ UR QL SCN>200 NG/ML: NORMAL
BLD GP AB SCN SERPL QL: NORMAL
CANNABINOIDS UR QL SCN>50 NG/ML: NORMAL
COCAINE UR QL SCN: NORMAL
DEPRECATED RDW RBC AUTO: 14.5 % (ref 12.4–15.4)
DRUG SCREEN COMMENT UR-IMP: NORMAL
EOSINOPHIL # BLD: 0.1 K/UL (ref 0–0.6)
EOSINOPHIL NFR BLD: 1.1 %
FENTANYL SCREEN, URINE: NORMAL
HCT VFR BLD AUTO: 34.2 % (ref 36–48)
HGB BLD-MCNC: 11.8 G/DL (ref 12–16)
LYMPHOCYTES # BLD: 2.4 K/UL (ref 1–5.1)
LYMPHOCYTES NFR BLD: 23.4 %
MCH RBC QN AUTO: 28.7 PG (ref 26–34)
MCHC RBC AUTO-ENTMCNC: 34.5 G/DL (ref 31–36)
MCV RBC AUTO: 83.4 FL (ref 80–100)
METHADONE UR QL SCN>300 NG/ML: NORMAL
MONOCYTES # BLD: 0.6 K/UL (ref 0–1.3)
MONOCYTES NFR BLD: 6 %
NEUTROPHILS # BLD: 7.1 K/UL (ref 1.7–7.7)
NEUTROPHILS NFR BLD: 69.2 %
OPIATES UR QL SCN>300 NG/ML: NORMAL
OXYCODONE UR QL SCN: NORMAL
PCP UR QL SCN>25 NG/ML: NORMAL
PH UR STRIP: 6 [PH]
PLATELET # BLD AUTO: 149 K/UL (ref 135–450)
PMV BLD AUTO: 9.1 FL (ref 5–10.5)
RBC # BLD AUTO: 4.1 M/UL (ref 4–5.2)
WBC # BLD AUTO: 10.3 K/UL (ref 4–11)

## 2024-02-24 PROCEDURE — 85025 COMPLETE CBC W/AUTO DIFF WBC: CPT

## 2024-02-24 PROCEDURE — 86900 BLOOD TYPING SEROLOGIC ABO: CPT

## 2024-02-24 PROCEDURE — 86901 BLOOD TYPING SEROLOGIC RH(D): CPT

## 2024-02-24 PROCEDURE — 86850 RBC ANTIBODY SCREEN: CPT

## 2024-02-24 PROCEDURE — 36415 COLL VENOUS BLD VENIPUNCTURE: CPT

## 2024-02-24 PROCEDURE — 86780 TREPONEMA PALLIDUM: CPT

## 2024-02-24 PROCEDURE — 80307 DRUG TEST PRSMV CHEM ANLYZR: CPT

## 2024-02-24 PROCEDURE — 99212 OFFICE O/P EST SF 10 MIN: CPT

## 2024-02-24 PROCEDURE — 2580000003 HC RX 258

## 2024-02-24 RX ORDER — SODIUM CHLORIDE, SODIUM LACTATE, POTASSIUM CHLORIDE, CALCIUM CHLORIDE 600; 310; 30; 20 MG/100ML; MG/100ML; MG/100ML; MG/100ML
INJECTION, SOLUTION INTRAVENOUS CONTINUOUS
Status: DISCONTINUED | OUTPATIENT
Start: 2024-02-24 | End: 2024-02-24 | Stop reason: HOSPADM

## 2024-02-24 RX ADMIN — SODIUM CHLORIDE, SODIUM LACTATE, POTASSIUM CHLORIDE, AND CALCIUM CHLORIDE: .6; .31; .03; .02 INJECTION, SOLUTION INTRAVENOUS at 18:15

## 2024-02-24 NOTE — PROGRESS NOTES
JEREL Kelly, CNM notified of pt SVE and fern negative status. Pt ctx irregularly but only feeling mild cramping. CNM would like pt to have labs drawn and IV fluid bolus. Will reevaluate shortly.
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
## 2024-02-25 LAB — REAGIN+T PALLIDUM IGG+IGM SERPL-IMP: NORMAL

## 2024-02-25 NOTE — FLOWSHEET NOTE
Order to discharge patient home. SVE closed/thick. Patient feels an occasional tightening in her abd and states she feels the same as she has been feeling for weeks. She denies pain, vaginal bleeding or any other concerns and desires to go home at this time. Precautions and discharge instructions reviewed. Patient and patients mother verbalize understanding.

## 2024-03-16 ENCOUNTER — HOSPITAL ENCOUNTER (OUTPATIENT)
Age: 19
Discharge: HOME | End: 2024-03-17
Attending: OBSTETRICS & GYNECOLOGY | Admitting: OBSTETRICS & GYNECOLOGY
Payer: MEDICAID

## 2024-03-16 VITALS
RESPIRATION RATE: 17 BRPM | WEIGHT: 183 LBS | OXYGEN SATURATION: 99 % | BODY MASS INDEX: 31.24 KG/M2 | HEIGHT: 64 IN | TEMPERATURE: 97.8 F | DIASTOLIC BLOOD PRESSURE: 81 MMHG | SYSTOLIC BLOOD PRESSURE: 122 MMHG | HEART RATE: 86 BPM

## 2024-03-17 PROCEDURE — 99212 OFFICE O/P EST SF 10 MIN: CPT

## 2024-03-17 NOTE — PROGRESS NOTES
Pt arrived to [facility] with reports of contractions 2-4 minutes apart, lasting 1 minute. States they started after she helped her sister move apartments today. Denies VB, denies LOF. Reports consistent fetal movement. Escorted  to T1, provided gown and urine sample obtained. VSS and EFM placed. SVE 1/thick/-3, posterior. [facility]

## 2024-03-17 NOTE — H&P
[facility]  Labor and Delivery Triage Note        CHIEF COMPLAINT:  contractions    HISTORY OF PRESENT ILLNESS:      The patient is a 18 y.o. female 39w2d.    OB History          1    Para        Term                AB        Living             SAB        IAB        Ectopic        Molar        Multiple        Live Births                  Patient presents complaining ofcontractions. Denies LOF/VB. (+) FM   Sees CNMs at [facility].     Estimated Due Date:  Estimated Date of Delivery: 3/22/24    PAST MEDICAL HISTORY:   Past Medical History:   Diagnosis Date    Anxiety and depression     Trazadone 15mg prior to pregnancy    Diabetes mellitus (HCC)        PAST  SURGICAL HISTORY:   Past Surgical History:   Procedure Laterality Date    HIATAL HERNIA REPAIR      TONSILLECTOMY      TYMPANOSTOMY TUBE PLACEMENT         SOCIAL HISTORY:     reports that she has never smoked. She has never used smokeless tobacco. She reports that she does not drink alcohol and does not use drugs.     MEDICATIONS:    Prior to Admission medications    Medication Sig Start Date End Date Taking? Authorizing Provider   Prenatal MV-Min-Fe Fum-FA-DHA (PRENATAL 1 PO) Take by mouth    Provider, MD Nannette          REVIEW OF SYSTEMS:     See HPI     PHYSICAL EXAM:    Vital Signs: Blood pressure 122/81, pulse 86, temperature 97.8 °F (36.6 °C), temperature source Axillary, resp. rate 17, height 1.626 m (5' 4\"), weight 83 kg (183 lb), last menstrual period 2023, SpO2 99 %.    GEN: NAD  ABD: soft/ NTTP/ gravid  EXT: nontender    Fetal heart rate:   120s/mod samra/ (+) accels   Cervix:  1/thick-3  Contraction frequency:  irregular    Membranes:  Intact        ASSESSMENT/PLAN:    39w2d with contractions  - SVE 1cm, unchanged from prior check in office   - pt was offered to stay and be rechecked or dc home per Aimee Deimling and pt desires dc home   -reassuring FHTs   -keep office appt this week      Laureen Olivia,

## 2024-03-17 NOTE — PROGRESS NOTES
[facility] notified of patient arrival. Contraction pattern, FHR and SVE reported. Order to monitor patient, PO hydrate and have house officer evaluate.

## 2024-03-17 NOTE — PROGRESS NOTES
Pt verbalized understanding of verbal and written discharge instructions and denies having questions at this time. Pt left OB unit at 0027 ambulatory, undelivered, and in stable condition, accompanied by friend. Patient is not in active labor.

## 2024-03-23 ENCOUNTER — HOSPITAL ENCOUNTER (INPATIENT)
Age: 19
LOS: 1 days | Discharge: HOME | DRG: 560 | End: 2024-03-24
Attending: OBSTETRICS & GYNECOLOGY | Admitting: OBSTETRICS & GYNECOLOGY
Payer: MEDICAID

## 2024-03-23 PROBLEM — Z37.9 NORMAL LABOR: Status: ACTIVE | Noted: 2024-03-23

## 2024-03-23 LAB
ABO + RH BLD: NORMAL
AMPHETAMINES UR QL SCN>1000 NG/ML: NORMAL
BARBITURATES UR QL SCN>200 NG/ML: NORMAL
BASOPHILS # BLD: 0.1 K/UL (ref 0–0.2)
BASOPHILS NFR BLD: 0.6 %
BENZODIAZ UR QL SCN>200 NG/ML: NORMAL
BLD GP AB SCN SERPL QL: NORMAL
BUPRENORPHINE+NOR UR QL SCN: NORMAL
CANNABINOIDS UR QL SCN>50 NG/ML: NORMAL
COCAINE UR QL SCN: NORMAL
DEPRECATED RDW RBC AUTO: 14.2 % (ref 12.4–15.4)
DRUG SCREEN COMMENT UR-IMP: NORMAL
EOSINOPHIL # BLD: 0.1 K/UL (ref 0–0.6)
EOSINOPHIL NFR BLD: 0.5 %
FENTANYL SCREEN, URINE: NORMAL
HCT VFR BLD AUTO: 35 % (ref 36–48)
HGB BLD-MCNC: 11.9 G/DL (ref 12–16)
LYMPHOCYTES # BLD: 2.2 K/UL (ref 1–5.1)
LYMPHOCYTES NFR BLD: 14.3 %
MCH RBC QN AUTO: 28.3 PG (ref 26–34)
MCHC RBC AUTO-ENTMCNC: 34.1 G/DL (ref 31–36)
MCV RBC AUTO: 82.9 FL (ref 80–100)
METHADONE UR QL SCN>300 NG/ML: NORMAL
MONOCYTES # BLD: 0.7 K/UL (ref 0–1.3)
MONOCYTES NFR BLD: 4.6 %
NEUTROPHILS # BLD: 12.2 K/UL (ref 1.7–7.7)
NEUTROPHILS NFR BLD: 80 %
OPIATES UR QL SCN>300 NG/ML: NORMAL
OXYCODONE UR QL SCN: NORMAL
PCP UR QL SCN>25 NG/ML: NORMAL
PH UR STRIP: 5 [PH]
PLATELET # BLD AUTO: 168 K/UL (ref 135–450)
PMV BLD AUTO: 10 FL (ref 5–10.5)
RBC # BLD AUTO: 4.22 M/UL (ref 4–5.2)
WBC # BLD AUTO: 15.3 K/UL (ref 4–11)

## 2024-03-23 PROCEDURE — 99212 OFFICE O/P EST SF 10 MIN: CPT

## 2024-03-23 PROCEDURE — 86850 RBC ANTIBODY SCREEN: CPT

## 2024-03-23 PROCEDURE — 10907ZC DRAINAGE OF AMNIOTIC FLUID, THERAPEUTIC FROM PRODUCTS OF CONCEPTION, VIA NATURAL OR ARTIFICIAL OPENING: ICD-10-PCS | Performed by: OBSTETRICS & GYNECOLOGY

## 2024-03-23 PROCEDURE — 2500000003 HC RX 250 WO HCPCS

## 2024-03-23 PROCEDURE — 86780 TREPONEMA PALLIDUM: CPT

## 2024-03-23 PROCEDURE — 6370000000 HC RX 637 (ALT 250 FOR IP)

## 2024-03-23 PROCEDURE — 80307 DRUG TEST PRSMV CHEM ANLYZR: CPT

## 2024-03-23 PROCEDURE — 6360000002 HC RX W HCPCS

## 2024-03-23 PROCEDURE — 7200000001 HC VAGINAL DELIVERY

## 2024-03-23 PROCEDURE — 86900 BLOOD TYPING SEROLOGIC ABO: CPT

## 2024-03-23 PROCEDURE — 85025 COMPLETE CBC W/AUTO DIFF WBC: CPT

## 2024-03-23 PROCEDURE — 1220000000 HC SEMI PRIVATE OB R&B

## 2024-03-23 PROCEDURE — 0KQM0ZZ REPAIR PERINEUM MUSCLE, OPEN APPROACH: ICD-10-PCS | Performed by: OBSTETRICS & GYNECOLOGY

## 2024-03-23 PROCEDURE — 86901 BLOOD TYPING SEROLOGIC RH(D): CPT

## 2024-03-23 PROCEDURE — 6360000002 HC RX W HCPCS: Performed by: OBSTETRICS & GYNECOLOGY

## 2024-03-23 RX ORDER — ONDANSETRON 4 MG/1
4 TABLET, ORALLY DISINTEGRATING ORAL EVERY 6 HOURS PRN
Status: DISCONTINUED | OUTPATIENT
Start: 2024-03-23 | End: 2024-03-24 | Stop reason: HOSPADM

## 2024-03-23 RX ORDER — IBUPROFEN 800 MG/1
800 TABLET ORAL EVERY 8 HOURS PRN
Status: DISCONTINUED | OUTPATIENT
Start: 2024-03-23 | End: 2024-03-24 | Stop reason: HOSPADM

## 2024-03-23 RX ORDER — SODIUM CHLORIDE, SODIUM LACTATE, POTASSIUM CHLORIDE, CALCIUM CHLORIDE 600; 310; 30; 20 MG/100ML; MG/100ML; MG/100ML; MG/100ML
INJECTION, SOLUTION INTRAVENOUS CONTINUOUS
Status: DISCONTINUED | OUTPATIENT
Start: 2024-03-23 | End: 2024-03-24 | Stop reason: HOSPADM

## 2024-03-23 RX ORDER — LANOLIN 100 %
OINTMENT (GRAM) TOPICAL PRN
Status: DISCONTINUED | OUTPATIENT
Start: 2024-03-23 | End: 2024-03-24 | Stop reason: HOSPADM

## 2024-03-23 RX ORDER — CARBOPROST TROMETHAMINE 250 UG/ML
250 INJECTION, SOLUTION INTRAMUSCULAR PRN
Status: DISCONTINUED | OUTPATIENT
Start: 2024-03-23 | End: 2024-03-23

## 2024-03-23 RX ORDER — NALBUPHINE HYDROCHLORIDE 20 MG/ML
10 INJECTION, SOLUTION INTRAMUSCULAR; INTRAVENOUS; SUBCUTANEOUS
Status: DISCONTINUED | OUTPATIENT
Start: 2024-03-23 | End: 2024-03-23

## 2024-03-23 RX ORDER — LIDOCAINE HYDROCHLORIDE 10 MG/ML
INJECTION, SOLUTION INFILTRATION; PERINEURAL
Status: COMPLETED
Start: 2024-03-23 | End: 2024-03-23

## 2024-03-23 RX ORDER — ACETAMINOPHEN 500 MG
1000 TABLET ORAL EVERY 8 HOURS PRN
Status: DISCONTINUED | OUTPATIENT
Start: 2024-03-23 | End: 2024-03-24 | Stop reason: HOSPADM

## 2024-03-23 RX ORDER — SODIUM CHLORIDE 0.9 % (FLUSH) 0.9 %
5-40 SYRINGE (ML) INJECTION PRN
Status: DISCONTINUED | OUTPATIENT
Start: 2024-03-23 | End: 2024-03-24 | Stop reason: HOSPADM

## 2024-03-23 RX ORDER — METHYLERGONOVINE MALEATE 0.2 MG/ML
200 INJECTION INTRAVENOUS PRN
Status: DISCONTINUED | OUTPATIENT
Start: 2024-03-23 | End: 2024-03-23

## 2024-03-23 RX ORDER — DOCUSATE SODIUM 100 MG/1
100 CAPSULE, LIQUID FILLED ORAL 2 TIMES DAILY
Status: DISCONTINUED | OUTPATIENT
Start: 2024-03-23 | End: 2024-03-24 | Stop reason: HOSPADM

## 2024-03-23 RX ORDER — CARBOPROST TROMETHAMINE 250 UG/ML
250 INJECTION, SOLUTION INTRAMUSCULAR PRN
Status: DISCONTINUED | OUTPATIENT
Start: 2024-03-23 | End: 2024-03-24 | Stop reason: HOSPADM

## 2024-03-23 RX ORDER — SODIUM CHLORIDE 9 MG/ML
25 INJECTION, SOLUTION INTRAVENOUS PRN
Status: DISCONTINUED | OUTPATIENT
Start: 2024-03-23 | End: 2024-03-23

## 2024-03-23 RX ORDER — FERROUS SULFATE 325(65) MG
325 TABLET ORAL EVERY OTHER DAY
Status: DISCONTINUED | OUTPATIENT
Start: 2024-03-23 | End: 2024-03-24 | Stop reason: HOSPADM

## 2024-03-23 RX ORDER — METHYLERGONOVINE MALEATE 0.2 MG/ML
200 INJECTION INTRAVENOUS PRN
Status: DISCONTINUED | OUTPATIENT
Start: 2024-03-23 | End: 2024-03-24 | Stop reason: HOSPADM

## 2024-03-23 RX ORDER — SODIUM CHLORIDE 0.9 % (FLUSH) 0.9 %
5-40 SYRINGE (ML) INJECTION EVERY 12 HOURS SCHEDULED
Status: DISCONTINUED | OUTPATIENT
Start: 2024-03-23 | End: 2024-03-24 | Stop reason: HOSPADM

## 2024-03-23 RX ORDER — SODIUM CHLORIDE 0.9 % (FLUSH) 0.9 %
5-40 SYRINGE (ML) INJECTION PRN
Status: DISCONTINUED | OUTPATIENT
Start: 2024-03-23 | End: 2024-03-23

## 2024-03-23 RX ORDER — LIDOCAINE HYDROCHLORIDE 10 MG/ML
2 INJECTION, SOLUTION EPIDURAL; INFILTRATION; INTRACAUDAL; PERINEURAL ONCE
Status: DISCONTINUED | OUTPATIENT
Start: 2024-03-23 | End: 2024-03-23

## 2024-03-23 RX ORDER — ACETAMINOPHEN 325 MG/1
650 TABLET ORAL EVERY 4 HOURS PRN
Status: DISCONTINUED | OUTPATIENT
Start: 2024-03-23 | End: 2024-03-23

## 2024-03-23 RX ORDER — TERBUTALINE SULFATE 1 MG/ML
0.25 INJECTION, SOLUTION SUBCUTANEOUS
Status: DISCONTINUED | OUTPATIENT
Start: 2024-03-23 | End: 2024-03-23

## 2024-03-23 RX ORDER — MISOPROSTOL 100 UG/1
400 TABLET ORAL PRN
Status: DISCONTINUED | OUTPATIENT
Start: 2024-03-23 | End: 2024-03-23

## 2024-03-23 RX ORDER — MISOPROSTOL 100 UG/1
400 TABLET ORAL PRN
Status: DISCONTINUED | OUTPATIENT
Start: 2024-03-23 | End: 2024-03-24 | Stop reason: HOSPADM

## 2024-03-23 RX ORDER — SODIUM CHLORIDE, SODIUM LACTATE, POTASSIUM CHLORIDE, AND CALCIUM CHLORIDE .6; .31; .03; .02 G/100ML; G/100ML; G/100ML; G/100ML
500 INJECTION, SOLUTION INTRAVENOUS PRN
Status: DISCONTINUED | OUTPATIENT
Start: 2024-03-23 | End: 2024-03-23

## 2024-03-23 RX ORDER — SODIUM CHLORIDE 9 MG/ML
INJECTION, SOLUTION INTRAVENOUS PRN
Status: DISCONTINUED | OUTPATIENT
Start: 2024-03-23 | End: 2024-03-24 | Stop reason: HOSPADM

## 2024-03-23 RX ORDER — SODIUM CHLORIDE 0.9 % (FLUSH) 0.9 %
5-40 SYRINGE (ML) INJECTION EVERY 12 HOURS SCHEDULED
Status: DISCONTINUED | OUTPATIENT
Start: 2024-03-23 | End: 2024-03-23

## 2024-03-23 RX ORDER — SODIUM CHLORIDE, SODIUM LACTATE, POTASSIUM CHLORIDE, AND CALCIUM CHLORIDE .6; .31; .03; .02 G/100ML; G/100ML; G/100ML; G/100ML
1000 INJECTION, SOLUTION INTRAVENOUS PRN
Status: DISCONTINUED | OUTPATIENT
Start: 2024-03-23 | End: 2024-03-23

## 2024-03-23 RX ORDER — ONDANSETRON 2 MG/ML
4 INJECTION INTRAMUSCULAR; INTRAVENOUS EVERY 6 HOURS PRN
Status: DISCONTINUED | OUTPATIENT
Start: 2024-03-23 | End: 2024-03-24 | Stop reason: HOSPADM

## 2024-03-23 RX ADMIN — Medication 909.1 MILLI-UNITS/MIN: at 09:59

## 2024-03-23 RX ADMIN — BENZOCAINE AND LEVOMENTHOL: 200; 5 SPRAY TOPICAL at 11:32

## 2024-03-23 RX ADMIN — NALBUPHINE HYDROCHLORIDE 10 MG: 20 INJECTION, SOLUTION INTRAMUSCULAR; INTRAVENOUS; SUBCUTANEOUS at 08:10

## 2024-03-23 RX ADMIN — ACETAMINOPHEN 1000 MG: 500 TABLET ORAL at 16:36

## 2024-03-23 RX ADMIN — IBUPROFEN 800 MG: 800 TABLET, FILM COATED ORAL at 20:14

## 2024-03-23 RX ADMIN — IBUPROFEN 800 MG: 800 TABLET, FILM COATED ORAL at 11:33

## 2024-03-23 RX ADMIN — LIDOCAINE HYDROCHLORIDE 100 MG: 10 INJECTION, SOLUTION INFILTRATION; PERINEURAL at 10:03

## 2024-03-23 RX ADMIN — Medication 166.7 ML: at 09:57

## 2024-03-23 RX ADMIN — WITCH HAZEL: 500 SOLUTION RECTAL; TOPICAL at 11:32

## 2024-03-23 RX ADMIN — DOCUSATE SODIUM 100 MG: 100 CAPSULE, LIQUID FILLED ORAL at 20:14

## 2024-03-23 ASSESSMENT — PAIN DESCRIPTION - LOCATION
LOCATION: ABDOMEN
LOCATION: PERINEUM
LOCATION: ABDOMEN

## 2024-03-23 ASSESSMENT — PAIN DESCRIPTION - DESCRIPTORS
DESCRIPTORS: CRAMPING
DESCRIPTORS: SORE
DESCRIPTORS: CRAMPING

## 2024-03-23 ASSESSMENT — PAIN SCALES - GENERAL
PAINLEVEL_OUTOF10: 10
PAINLEVEL_OUTOF10: 4
PAINLEVEL_OUTOF10: 5

## 2024-03-23 ASSESSMENT — PAIN - FUNCTIONAL ASSESSMENT: PAIN_FUNCTIONAL_ASSESSMENT: ACTIVITIES ARE NOT PREVENTED

## 2024-03-23 NOTE — PROGRESS NOTES
Spoke with NATALIA Haines CNM regarding patient's arrival to triage for r/o labor. SVE 2-3/70/-1. Similar to exam in office. Category 1 tracing. Contractions q2-5 min. Patient to be removed from monitor and walk.

## 2024-03-23 NOTE — PROGRESS NOTES
Patient ambulatory to exam T2 for evaluation of labor.  Patient given gown to change into and oriented to room.  Plan of care reviewed.  Patient verbalized understanding.  EFM applied with consent to central monitor bank with alarms on.  Uterus soft and non-tender. Positive Fetal movement.
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
## 2024-03-23 NOTE — PROGRESS NOTES
Patient and mother requesting that RN check patient's blood glucose. Stated that I would need an order from the provider. Spoke with NATALIA Haines CNM regarding patient and mother request. No need to check patient's glucose - no diagnosis during pregnancy or prior of diabetes. Patient may have juice or popsicle.

## 2024-03-23 NOTE — PROCEDURES
Department of Obstetrics and Gynecology  Spontaneous Vaginal Delivery Note         Pre-operative Diagnosis:  Term pregnancy, Spontaneous labor, Single fetus, and Pregnancy complicated by: Marginal cord insertion    Post-operative Diagnosis:  Living  infant(s) and Male    Procedure:  Spontaneous vaginal delivery or Repair second degree spontaneous laceration    Provider:   JEREL Kelly CNM    Information for the patient's :  James Henry [[id number]]        Anesthesia:  none    Estimated blood loss:  350ml    Specimen:  Placenta not sent to pathology     Cord blood sent No    Complications:  none    Condition:  infant stable and mother stable    Details of Procedure:   The patient is a 18 y.o. female at 40w1d   OB History          1    Para        Term                AB        Living             SAB        IAB        Ectopic        Molar        Multiple        Live Births                who was admitted for latent labor. She received the following interventions: ARBOW. She was known to be GBS negative and did not receive antibiotic prophylaxis. The patient progressed well, did not receive an epidural, became complete and started to push. After pushing for 4 minutes, the fetal head was at the perineum, the rest of the infant delivered atraumatically, and was placed on the mother's abdomen. The cord was clamped and cut after delay and the infant was placed skin to skin with the mother. The delivery of the placenta was spontaneous. The perineum and vagina were explored and a second degree laceration was repaired in standard fashion under local anesthetic. Dr. Pierce will be notified of this delivery.

## 2024-03-23 NOTE — PROGRESS NOTES
Spoke with NATALIA Haines CNM. SVE unchanged. Patient is stating that she \"can't do this\" and it \"can't be normal\". Having a lot of pain in her hips and back. Orders to admit.

## 2024-03-23 NOTE — PROGRESS NOTES
Patient ambulatory to room 386 for admission for labor.  Patient and family oriented to room.  Call light explained and provided.  EFM applied with consent to central monitor bank with alarms on.  Uterus soft and non-tender.  Admission history obtained, laboratory results reviewed and appropriate consents signed/reviewed.  Reviewed  safety and security, initial care of the  and medications given at delivery.  Questions and concerns addressed/answered.

## 2024-03-24 VITALS
WEIGHT: 183 LBS | TEMPERATURE: 98 F | BODY MASS INDEX: 31.24 KG/M2 | HEART RATE: 75 BPM | HEIGHT: 64 IN | SYSTOLIC BLOOD PRESSURE: 117 MMHG | RESPIRATION RATE: 18 BRPM | DIASTOLIC BLOOD PRESSURE: 60 MMHG

## 2024-03-24 PROBLEM — Z34.90 TERM PREGNANCY: Status: ACTIVE | Noted: 2024-03-24

## 2024-03-24 LAB
DEPRECATED RDW RBC AUTO: 14.1 % (ref 12.4–15.4)
HCT VFR BLD AUTO: 30.2 % (ref 36–48)
HGB BLD-MCNC: 10.2 G/DL (ref 12–16)
MCH RBC QN AUTO: 28.6 PG (ref 26–34)
MCHC RBC AUTO-ENTMCNC: 33.7 G/DL (ref 31–36)
MCV RBC AUTO: 85 FL (ref 80–100)
PLATELET # BLD AUTO: 167 K/UL (ref 135–450)
PMV BLD AUTO: 10.3 FL (ref 5–10.5)
RBC # BLD AUTO: 3.56 M/UL (ref 4–5.2)
REAGIN+T PALLIDUM IGG+IGM SERPL-IMP: NORMAL
WBC # BLD AUTO: 11.9 K/UL (ref 4–11)

## 2024-03-24 PROCEDURE — 36415 COLL VENOUS BLD VENIPUNCTURE: CPT

## 2024-03-24 PROCEDURE — 85027 COMPLETE CBC AUTOMATED: CPT

## 2024-03-24 PROCEDURE — 6370000000 HC RX 637 (ALT 250 FOR IP)

## 2024-03-24 RX ORDER — LIDOCAINE HYDROCHLORIDE 10 MG/ML
INJECTION, SOLUTION EPIDURAL; INFILTRATION; INTRACAUDAL; PERINEURAL
Status: DISCONTINUED
Start: 2024-03-24 | End: 2024-03-24 | Stop reason: HOSPADM

## 2024-03-24 RX ADMIN — DOCUSATE SODIUM 100 MG: 100 CAPSULE, LIQUID FILLED ORAL at 09:37

## 2024-03-24 RX ADMIN — ACETAMINOPHEN 1000 MG: 500 TABLET ORAL at 00:00

## 2024-03-24 RX ADMIN — IBUPROFEN 800 MG: 800 TABLET, FILM COATED ORAL at 04:26

## 2024-03-24 RX ADMIN — ACETAMINOPHEN 1000 MG: 500 TABLET ORAL at 09:37

## 2024-03-24 ASSESSMENT — PAIN DESCRIPTION - DESCRIPTORS: DESCRIPTORS: CRAMPING

## 2024-03-24 ASSESSMENT — PAIN DESCRIPTION - LOCATION
LOCATION: ABDOMEN
LOCATION: ABDOMEN;PERINEUM

## 2024-03-24 ASSESSMENT — PAIN SCALES - GENERAL
PAINLEVEL_OUTOF10: 4
PAINLEVEL_OUTOF10: 5

## 2024-03-24 NOTE — CARE COORDINATION
Social Work Consult/Assessment    Reason for Consult:    primip, anxiety/depression (no meds) problems with FOB     Electronic record reviewed:yes   Delivery information:  3/23 0942 6 lbs 9.8oz    Marital Status: single  Mob's UDS on admission:negative   Infant's UDS/Cord tox:not ordered/pending     Spoke with Mob today explained SW services.yes  Present in the room:  Living situation:lives with grandparents  Address and phone: [address] [place] [phone]/ [phone]  Spouse or significant other:boyfriend and FOB Nigel Esquivel   Children:Tristan Esquivel 3/23/24  Children's Protective Services involvement: denies  Support system:Mother  Domestic Violence:denies  Mental Health:has anxiety and depression and see a therapist on a regular basis. Has stopped meds during pregnancy. Will resume if needed after speaking with her dr and therapist. Patient denies any concerns or suicidal thoughts at this time.   Post Partum Depression:denies at this time. Patient informed of signs and symptoms of PPD and encouraged to reach out to her doctors and therapist if ever concerned.   Substance Abuse:denies   Social Assistance Programs: WIC yes Food [place] no  Medicaid yes  Supplies: has car seat and crib  Every Child Succeeds:denies at this time     Summary:KATY plans to return to her grandparents house with baby. KATY feels safe in this environment and denies any needs.  KATY is active with doctor and therapist in dealing with her anxiety and depression. She feels comfortable to continue with her current plan of treatment and feels ready to take baby home.

## 2024-03-24 NOTE — DISCHARGE SUMMARY
Obstetrical Discharge Form    Gestational Age: 40w1d    Antepartum complications: marginal cord insertion    Date of Delivery: 3/24/24      Type of Delivery: vaginal, spontaneous    Delivered By: ABBI CHENG     Baby:      Information for the patient's :  CarlJames [[id number]]   APGAR One: 9   Information for the patient's :  HenryJames [[id number]]   APGAR Five: 9   Information for the patient's :  James Henry [[id number]]   Birth Weight: 2.998 kg (6 lb 9.8 oz)     Anesthesia: Local    Intrapartum complications: None    Postpartum complications: anemia    Discharge Medication:      Medication List        ASK your doctor about these medications      PRENATAL 1 PO              Discharge Condition:  good    Discharge Date: 3/24/24    PLAN:  Follow up in 6 weeks for routine PP visit or PRN  Reviewed PP warning signs and when to call  All questions answered  D/C summary begun at delivery for D/C planning purposes, any delay in discharge from ordered D/C date due to  factors.

## 2024-03-24 NOTE — FLOWSHEET NOTE
Postpartum and infant care teaching completed and forms signed by patient. Copy witnessed by RN and given to patient. Patient verbalized understanding of all teaching points. Prescriptions given if applicable. Patient plans to follow-up with OB Provider as instructed.  Home visit planned. Patient verbalizes understanding of discharge instructions and denies further questions.  ID bands checked. Mother's ID band and one of baby's ID bands removed and taped to discharge instruction sheet, signed by patient and witnessed by RN. Patient discharged in stable condition accompanied by family/guardian. Discharged in wheelchair, holding baby in arms.

## 2024-03-24 NOTE — DISCHARGE INSTRUCTIONS
Thank you for the opportunity to care for you and your family.    We hope that you are happy with the care we provided during your stay in the [facility]. We want to ensure that you have the help you need when you leave the hospital. If there is anything we can assist you with, please let us know.    Breastfeeding mothers may contact our lactation specialists with any problems or questions.  [facility] lactation services phone number is [phone].  Leave a message and your call will be returned.    Please refer to the information provided in the postpartum care booklet.  The following are warning signs to remember.    Call 911 if you have:    Chest pain or pressure  Shortness of breath, even at rest  Thoughts of harming yourself or others  Seizures    Call your healthcare provider if you have:    Temperature of 100.4 degrees or higher  Stitches that are not healing        -- Swelling, bleeding, drainage, foul odor, redness or warmth in/around your           stitches, staples, or incision (scar)        -- Bad smelling blood or discharge from the vagina  Vaginal bleeding that has increased         -- Soaking through one pad in an hour        -- You are passing clots larger than the size of a lemon  Red, warm tender area(s) in your breast or calf  Headache that does not get better, even after taking medicine; or headache with vision changes    Remember to notify all healthcare providers from your date of delivery to up to one year after giving birth!    CARING FOR YOURSELF        DIET/ACTIVITY    Eat a well balanced diet focusing on foods high in fiber and protein.  Drink plenty of fluids, especially water.  To avoid constipation you may take a mild stool softener as recommended by your doctor or midwife.  Gradually increase your activity. Resume an exercise regime only after being advised by your doctor or midwife.  When sitting or lying down, keep your legs elevated to reduce swelling.  Avoid

## 2024-03-24 NOTE — FLOWSHEET NOTE
Discharge Phone Call    Patient Name: Irene Henry     OB Care Provider: Laureen Olivia MD Discharge Date: 3/24/2024    Disposition of baby:    Phone Number: [phone] (home)     Attempts to Contact:  Date:    Caller  Date:    Caller  Date:    Caller    Information for the patient's :  James Henry [[id number]]   Delivery Method: Vaginal, Spontaneous     1.  Now that you are at home is your pain being well controlled?   Y/N   If no, instruct to call       provider.      2. Are you breastfeeding?    Y/N    Do you need any extra support from our lactation staff?      Y/N    If yes, provide number for lactation.  3. Have you made or already had your first appointment with the baby's doctor? Y/N   If no, do      you know when to schedule it?  Y/N    4. Have you scheduled your follow-up appointment?  Y/N  If no, do you know when to schedule       it?    Y/N   If no, they can find it on printed discharge instructions.  5. Did staff discuss safe sleep during your stay? Y/N   6. Did we explain things in a way you could understand?  Y/N  7. Were we respectful of your preferences for labor and birth and include you in the plan of       care?  Y/N  If no, please explain _______________________________________________  8. Is there anyone in particular you would like to mention who provided care for you? _______      _________________________________________________________________________     9. Were you given a Post-Birth Warning Signs handout?  Y/N  Do you have it somewhere      easily accessible?  Y/N  If no, please send them a copy and ask them to put it somewhere      easily found.  10. Have you been crying excessively, having anger or mood swings that feel out of control, or       feel like you can't cope with caring for yourself or baby? Y/N   If yes, they may be showing       signs of postpartum depression and should call provider. There is also a        depression test on page C5 in their

## 2024-03-24 NOTE — PROGRESS NOTES
Department of Obstetrics and Gynecology  Labor and Delivery  Attending Post Partum Progress Note      SUBJECTIVE:  Denies c/o. Tolerating a regular diet. Ambulating and voiding w/o difficulty. Bleeding WNL. Pain well controlled w/ Tylenol and ibuprofen. Bottle feeding infant. Pt states she had an episode overnight of extreme pain in her perineum. RN checked and all that was seen was swelling. Pt then voided and pressure was relieved. Desires d/c home later today.     OBJECTIVE:      Vitals:  /62   Pulse 81   Temp 97.9 °F (36.6 °C) (Oral)   Resp 16   Ht 1.626 m (5' 4\")   Wt 83 kg (183 lb)   LMP 2023   Breastfeeding Unknown   BMI 31.41 kg/m²     ABDOMEN:  FF @ u/1, midline, non-tender  GENITAL/URINARY:  well-approximated, mild edema noted  LE: mild edema, no evidence of DVT    DATA:    CBC:    Lab Results   Component Value Date/Time    WBC 11.9 2024 07:19 AM    RBC 3.56 2024 07:19 AM    HGB 10.2 2024 07:19 AM    HCT 30.2 2024 07:19 AM    MCV 85.0 2024 07:19 AM    RDW 14.1 2024 07:19 AM     2024 07:19 AM       ASSESSMENT & PLAN:      PPD 1 s/p   Meeting PP milestones  Stable, male bottle feeding infant  Continue routine PP care   Will consider d/c later today pending infant d/c and pt status

## 2024-03-24 NOTE — PROGRESS NOTES
D/c home today  Rx for ibuprofen  RTO 6 wks or PRN  Reviewed PP warning signs and when to call    See earlier note for today's PP progress note.

## 2024-03-24 NOTE — PLAN OF CARE
Problem: Postpartum  Goal: Experiences normal postpartum course  Description:  Postpartum OB-Pregnancy care plan goal which identifies if the mother is experiencing a normal postpartum course  Outcome: Progressing  Goal: Appropriate maternal -  bonding  Description:  Postpartum OB-Pregnancy care plan goal which identifies if the mother and  are bonding appropriately  Outcome: Progressing  Goal: Establishment of infant feeding pattern  Description:  Postpartum OB-Pregnancy care plan goal which identifies if the mother is establishing a feeding pattern with their   Outcome: Progressing  Goal: Incisions, wounds, or drain sites healing without S/S of infection  Outcome: Progressing     Problem: Pain  Goal: Verbalizes/displays adequate comfort level or baseline comfort level  Outcome: Progressing     Problem: Infection - Adult  Goal: Absence of infection at discharge  Outcome: Progressing  Goal: Absence of infection during hospitalization  Outcome: Progressing  Goal: Absence of fever/infection during anticipated neutropenic period  Outcome: Progressing     Problem: Safety - Adult  Goal: Free from fall injury  Outcome: Progressing     Problem: Discharge Planning  Goal: Discharge to home or other facility with appropriate resources  Outcome: Progressing     Problem: Chronic Conditions and Co-morbidities  Goal: Patient's chronic conditions and co-morbidity symptoms are monitored and maintained or improved  Outcome: Progressing     
neutropenic period  3/23/2024 2041 by Dari Coleman, RN  Outcome: Progressing     Problem: Safety - Adult  Goal: Free from fall injury  3/23/2024 2041 by Dari Coleman, RN  Outcome: Progressing     Problem: Discharge Planning  Goal: Discharge to home or other facility with appropriate resources  3/23/2024 2041 by Dari Coleman, RN  Outcome: Progressing     Problem: Chronic Conditions and Co-morbidities  Goal: Patient's chronic conditions and co-morbidity symptoms are monitored and maintained or improved  3/23/2024 2041 by Dari Coleman, RN  Outcome: Progressing